# Patient Record
Sex: FEMALE | Race: BLACK OR AFRICAN AMERICAN | NOT HISPANIC OR LATINO | URBAN - METROPOLITAN AREA
[De-identification: names, ages, dates, MRNs, and addresses within clinical notes are randomized per-mention and may not be internally consistent; named-entity substitution may affect disease eponyms.]

---

## 2017-07-01 ENCOUNTER — OUTPATIENT (OUTPATIENT)
Dept: OUTPATIENT SERVICES | Facility: HOSPITAL | Age: 82
LOS: 1 days | End: 2017-07-01
Payer: MEDICAID

## 2017-07-06 DIAGNOSIS — R69 ILLNESS, UNSPECIFIED: ICD-10-CM

## 2018-01-01 PROCEDURE — G9001: CPT

## 2018-01-24 ENCOUNTER — INPATIENT (INPATIENT)
Facility: HOSPITAL | Age: 83
LOS: 1 days | Discharge: HOME HEALTH | End: 2018-01-26
Attending: INTERNAL MEDICINE | Admitting: INTERNAL MEDICINE
Payer: MEDICARE

## 2018-01-24 VITALS
TEMPERATURE: 98 F | SYSTOLIC BLOOD PRESSURE: 140 MMHG | OXYGEN SATURATION: 79 % | HEART RATE: 73 BPM | DIASTOLIC BLOOD PRESSURE: 64 MMHG | RESPIRATION RATE: 18 BRPM

## 2018-01-24 DIAGNOSIS — J10.1 INFLUENZA DUE TO OTHER IDENTIFIED INFLUENZA VIRUS WITH OTHER RESPIRATORY MANIFESTATIONS: ICD-10-CM

## 2018-01-24 DIAGNOSIS — J44.1 CHRONIC OBSTRUCTIVE PULMONARY DISEASE WITH (ACUTE) EXACERBATION: ICD-10-CM

## 2018-01-24 DIAGNOSIS — I10 ESSENTIAL (PRIMARY) HYPERTENSION: ICD-10-CM

## 2018-01-24 DIAGNOSIS — I25.10 ATHEROSCLEROTIC HEART DISEASE OF NATIVE CORONARY ARTERY WITHOUT ANGINA PECTORIS: ICD-10-CM

## 2018-01-24 LAB
ALBUMIN SERPL ELPH-MCNC: 3.8 G/DL — SIGNIFICANT CHANGE UP (ref 3.3–5)
ALP SERPL-CCNC: 75 U/L — SIGNIFICANT CHANGE UP (ref 40–120)
ALT FLD-CCNC: 14 U/L — SIGNIFICANT CHANGE UP (ref 4–33)
APTT BLD: 38.8 SEC — HIGH (ref 27.5–37.4)
AST SERPL-CCNC: 25 U/L — SIGNIFICANT CHANGE UP (ref 4–32)
B PERT DNA SPEC QL NAA+PROBE: SIGNIFICANT CHANGE UP
BASE EXCESS BLDV CALC-SCNC: 2.2 MMOL/L — SIGNIFICANT CHANGE UP
BASOPHILS # BLD AUTO: 0.02 K/UL — SIGNIFICANT CHANGE UP (ref 0–0.2)
BASOPHILS NFR BLD AUTO: 0.4 % — SIGNIFICANT CHANGE UP (ref 0–2)
BASOPHILS NFR SPEC: 0 % — SIGNIFICANT CHANGE UP (ref 0–2)
BILIRUB SERPL-MCNC: 0.3 MG/DL — SIGNIFICANT CHANGE UP (ref 0.2–1.2)
BLOOD GAS VENOUS - CREATININE: 0.85 MG/DL — SIGNIFICANT CHANGE UP (ref 0.5–1.3)
BUN SERPL-MCNC: 12 MG/DL — SIGNIFICANT CHANGE UP (ref 7–23)
C PNEUM DNA SPEC QL NAA+PROBE: NOT DETECTED — SIGNIFICANT CHANGE UP
CALCIUM SERPL-MCNC: 8.1 MG/DL — LOW (ref 8.4–10.5)
CHLORIDE BLDV-SCNC: 108 MMOL/L — SIGNIFICANT CHANGE UP (ref 96–108)
CHLORIDE SERPL-SCNC: 104 MMOL/L — SIGNIFICANT CHANGE UP (ref 98–107)
CK MB BLD-MCNC: 1.39 NG/ML — SIGNIFICANT CHANGE UP (ref 1–4.7)
CK SERPL-CCNC: 134 U/L — SIGNIFICANT CHANGE UP (ref 25–170)
CO2 SERPL-SCNC: 24 MMOL/L — SIGNIFICANT CHANGE UP (ref 22–31)
CREAT SERPL-MCNC: 1 MG/DL — SIGNIFICANT CHANGE UP (ref 0.5–1.3)
EOSINOPHIL # BLD AUTO: 0.02 K/UL — SIGNIFICANT CHANGE UP (ref 0–0.5)
EOSINOPHIL NFR BLD AUTO: 0.4 % — SIGNIFICANT CHANGE UP (ref 0–6)
EOSINOPHIL NFR FLD: 0 % — SIGNIFICANT CHANGE UP (ref 0–6)
FLUAV H1 2009 PAND RNA SPEC QL NAA+PROBE: NOT DETECTED — SIGNIFICANT CHANGE UP
FLUAV H1 RNA SPEC QL NAA+PROBE: NOT DETECTED — SIGNIFICANT CHANGE UP
FLUAV H3 RNA SPEC QL NAA+PROBE: NOT DETECTED — SIGNIFICANT CHANGE UP
FLUAV SUBTYP SPEC NAA+PROBE: SIGNIFICANT CHANGE UP
FLUBV RNA SPEC QL NAA+PROBE: POSITIVE — HIGH
GAS PNL BLDV: 140 MMOL/L — SIGNIFICANT CHANGE UP (ref 136–146)
GIANT PLATELETS BLD QL SMEAR: PRESENT — SIGNIFICANT CHANGE UP
GLUCOSE BLDV-MCNC: 128 — HIGH (ref 70–99)
GLUCOSE SERPL-MCNC: 127 MG/DL — HIGH (ref 70–99)
HADV DNA SPEC QL NAA+PROBE: NOT DETECTED — SIGNIFICANT CHANGE UP
HCO3 BLDV-SCNC: 26 MMOL/L — SIGNIFICANT CHANGE UP (ref 20–27)
HCOV 229E RNA SPEC QL NAA+PROBE: NOT DETECTED — SIGNIFICANT CHANGE UP
HCOV HKU1 RNA SPEC QL NAA+PROBE: NOT DETECTED — SIGNIFICANT CHANGE UP
HCOV NL63 RNA SPEC QL NAA+PROBE: NOT DETECTED — SIGNIFICANT CHANGE UP
HCOV OC43 RNA SPEC QL NAA+PROBE: NOT DETECTED — SIGNIFICANT CHANGE UP
HCT VFR BLD CALC: 38.1 % — SIGNIFICANT CHANGE UP (ref 34.5–45)
HCT VFR BLDV CALC: 37.9 % — SIGNIFICANT CHANGE UP (ref 34.5–45)
HGB BLD-MCNC: 12.6 G/DL — SIGNIFICANT CHANGE UP (ref 11.5–15.5)
HGB BLDV-MCNC: 12.3 G/DL — SIGNIFICANT CHANGE UP (ref 11.5–15.5)
HMPV RNA SPEC QL NAA+PROBE: NOT DETECTED — SIGNIFICANT CHANGE UP
HPIV1 RNA SPEC QL NAA+PROBE: NOT DETECTED — SIGNIFICANT CHANGE UP
HPIV2 RNA SPEC QL NAA+PROBE: NOT DETECTED — SIGNIFICANT CHANGE UP
HPIV3 RNA SPEC QL NAA+PROBE: NOT DETECTED — SIGNIFICANT CHANGE UP
HPIV4 RNA SPEC QL NAA+PROBE: NOT DETECTED — SIGNIFICANT CHANGE UP
IMM GRANULOCYTES # BLD AUTO: 0.02 # — SIGNIFICANT CHANGE UP
IMM GRANULOCYTES NFR BLD AUTO: 0.4 % — SIGNIFICANT CHANGE UP (ref 0–1.5)
INR BLD: 1.08 — SIGNIFICANT CHANGE UP (ref 0.88–1.17)
LACTATE BLDV-MCNC: 1.2 MMOL/L — SIGNIFICANT CHANGE UP (ref 0.5–2)
LYMPHOCYTES # BLD AUTO: 1.23 K/UL — SIGNIFICANT CHANGE UP (ref 1–3.3)
LYMPHOCYTES # BLD AUTO: 22.9 % — SIGNIFICANT CHANGE UP (ref 13–44)
LYMPHOCYTES NFR SPEC AUTO: 23.7 % — SIGNIFICANT CHANGE UP (ref 13–44)
M PNEUMO DNA SPEC QL NAA+PROBE: NOT DETECTED — SIGNIFICANT CHANGE UP
MCHC RBC-ENTMCNC: 27 PG — SIGNIFICANT CHANGE UP (ref 27–34)
MCHC RBC-ENTMCNC: 33.1 % — SIGNIFICANT CHANGE UP (ref 32–36)
MCV RBC AUTO: 81.8 FL — SIGNIFICANT CHANGE UP (ref 80–100)
MONOCYTES # BLD AUTO: 0.49 K/UL — SIGNIFICANT CHANGE UP (ref 0–0.9)
MONOCYTES NFR BLD AUTO: 9.1 % — SIGNIFICANT CHANGE UP (ref 2–14)
MONOCYTES NFR BLD: 3.5 % — SIGNIFICANT CHANGE UP (ref 2–9)
NEUTROPHIL AB SER-ACNC: 71.9 % — SIGNIFICANT CHANGE UP (ref 43–77)
NEUTROPHILS # BLD AUTO: 3.58 K/UL — SIGNIFICANT CHANGE UP (ref 1.8–7.4)
NEUTROPHILS NFR BLD AUTO: 66.8 % — SIGNIFICANT CHANGE UP (ref 43–77)
NRBC # FLD: 0 — SIGNIFICANT CHANGE UP
NT-PROBNP SERPL-SCNC: 598.1 PG/ML — SIGNIFICANT CHANGE UP
PCO2 BLDV: 45 MMHG — SIGNIFICANT CHANGE UP (ref 41–51)
PH BLDV: 7.39 PH — SIGNIFICANT CHANGE UP (ref 7.32–7.43)
PLATELET # BLD AUTO: 152 K/UL — SIGNIFICANT CHANGE UP (ref 150–400)
PLATELET COUNT - ESTIMATE: NORMAL — SIGNIFICANT CHANGE UP
PMV BLD: 11.4 FL — SIGNIFICANT CHANGE UP (ref 7–13)
PO2 BLDV: 58 MMHG — HIGH (ref 35–40)
POIKILOCYTOSIS BLD QL AUTO: SLIGHT — SIGNIFICANT CHANGE UP
POTASSIUM BLDV-SCNC: 5.2 MMOL/L — HIGH (ref 3.4–4.5)
POTASSIUM SERPL-MCNC: 4.3 MMOL/L — SIGNIFICANT CHANGE UP (ref 3.5–5.3)
POTASSIUM SERPL-SCNC: 4.3 MMOL/L — SIGNIFICANT CHANGE UP (ref 3.5–5.3)
PROT SERPL-MCNC: 7.2 G/DL — SIGNIFICANT CHANGE UP (ref 6–8.3)
PROTHROM AB SERPL-ACNC: 12.4 SEC — SIGNIFICANT CHANGE UP (ref 9.8–13.1)
RBC # BLD: 4.66 M/UL — SIGNIFICANT CHANGE UP (ref 3.8–5.2)
RBC # FLD: 15.9 % — HIGH (ref 10.3–14.5)
REVIEW TO FOLLOW: YES — SIGNIFICANT CHANGE UP
RSV RNA SPEC QL NAA+PROBE: NOT DETECTED — SIGNIFICANT CHANGE UP
RV+EV RNA SPEC QL NAA+PROBE: NOT DETECTED — SIGNIFICANT CHANGE UP
SAO2 % BLDV: 87.5 % — HIGH (ref 60–85)
SODIUM SERPL-SCNC: 143 MMOL/L — SIGNIFICANT CHANGE UP (ref 135–145)
TARGETS BLD QL SMEAR: SIGNIFICANT CHANGE UP
TROPONIN T SERPL-MCNC: < 0.06 NG/ML — SIGNIFICANT CHANGE UP (ref 0–0.06)
VARIANT LYMPHS # BLD: 0.9 % — SIGNIFICANT CHANGE UP
WBC # BLD: 5.36 K/UL — SIGNIFICANT CHANGE UP (ref 3.8–10.5)
WBC # FLD AUTO: 5.36 K/UL — SIGNIFICANT CHANGE UP (ref 3.8–10.5)

## 2018-01-24 PROCEDURE — 99223 1ST HOSP IP/OBS HIGH 75: CPT

## 2018-01-24 PROCEDURE — 71046 X-RAY EXAM CHEST 2 VIEWS: CPT | Mod: 26

## 2018-01-24 RX ORDER — AZITHROMYCIN 500 MG/1
500 TABLET, FILM COATED ORAL EVERY 24 HOURS
Qty: 0 | Refills: 0 | Status: DISCONTINUED | OUTPATIENT
Start: 2018-01-25 | End: 2018-01-26

## 2018-01-24 RX ORDER — IPRATROPIUM/ALBUTEROL SULFATE 18-103MCG
3 AEROSOL WITH ADAPTER (GRAM) INHALATION EVERY 6 HOURS
Qty: 0 | Refills: 0 | Status: DISCONTINUED | OUTPATIENT
Start: 2018-01-24 | End: 2018-01-26

## 2018-01-24 RX ORDER — ASPIRIN/CALCIUM CARB/MAGNESIUM 324 MG
81 TABLET ORAL DAILY
Qty: 0 | Refills: 0 | Status: DISCONTINUED | OUTPATIENT
Start: 2018-01-24 | End: 2018-01-26

## 2018-01-24 RX ORDER — IPRATROPIUM/ALBUTEROL SULFATE 18-103MCG
3 AEROSOL WITH ADAPTER (GRAM) INHALATION ONCE
Qty: 0 | Refills: 0 | Status: COMPLETED | OUTPATIENT
Start: 2018-01-24 | End: 2018-01-24

## 2018-01-24 RX ORDER — ACETAMINOPHEN 500 MG
650 TABLET ORAL ONCE
Qty: 0 | Refills: 0 | Status: COMPLETED | OUTPATIENT
Start: 2018-01-24 | End: 2018-01-24

## 2018-01-24 RX ORDER — DILTIAZEM HCL 120 MG
240 CAPSULE, EXT RELEASE 24 HR ORAL
Qty: 0 | Refills: 0 | Status: DISCONTINUED | OUTPATIENT
Start: 2018-01-24 | End: 2018-01-26

## 2018-01-24 RX ORDER — BUDESONIDE AND FORMOTEROL FUMARATE DIHYDRATE 160; 4.5 UG/1; UG/1
2 AEROSOL RESPIRATORY (INHALATION)
Qty: 0 | Refills: 0 | Status: DISCONTINUED | OUTPATIENT
Start: 2018-01-24 | End: 2018-01-26

## 2018-01-24 RX ORDER — ENOXAPARIN SODIUM 100 MG/ML
30 INJECTION SUBCUTANEOUS EVERY 24 HOURS
Qty: 0 | Refills: 0 | Status: DISCONTINUED | OUTPATIENT
Start: 2018-01-24 | End: 2018-01-26

## 2018-01-24 RX ORDER — SIMVASTATIN 20 MG/1
10 TABLET, FILM COATED ORAL AT BEDTIME
Qty: 0 | Refills: 0 | Status: DISCONTINUED | OUTPATIENT
Start: 2018-01-24 | End: 2018-01-26

## 2018-01-24 RX ORDER — GABAPENTIN 400 MG/1
300 CAPSULE ORAL THREE TIMES A DAY
Qty: 0 | Refills: 0 | Status: DISCONTINUED | OUTPATIENT
Start: 2018-01-24 | End: 2018-01-26

## 2018-01-24 RX ORDER — AZITHROMYCIN 500 MG/1
500 TABLET, FILM COATED ORAL ONCE
Qty: 0 | Refills: 0 | Status: COMPLETED | OUTPATIENT
Start: 2018-01-24 | End: 2018-01-24

## 2018-01-24 RX ORDER — FUROSEMIDE 40 MG
40 TABLET ORAL DAILY
Qty: 0 | Refills: 0 | Status: DISCONTINUED | OUTPATIENT
Start: 2018-01-24 | End: 2018-01-26

## 2018-01-24 RX ORDER — GABAPENTIN 400 MG/1
1 CAPSULE ORAL
Qty: 0 | Refills: 0 | COMMUNITY

## 2018-01-24 RX ORDER — LISINOPRIL 2.5 MG/1
40 TABLET ORAL DAILY
Qty: 0 | Refills: 0 | Status: DISCONTINUED | OUTPATIENT
Start: 2018-01-25 | End: 2018-01-26

## 2018-01-24 RX ORDER — OXYMETAZOLINE HYDROCHLORIDE 0.5 MG/ML
1 SPRAY NASAL
Qty: 0 | Refills: 0 | Status: DISCONTINUED | OUTPATIENT
Start: 2018-01-24 | End: 2018-01-26

## 2018-01-24 RX ADMIN — BUDESONIDE AND FORMOTEROL FUMARATE DIHYDRATE 2 PUFF(S): 160; 4.5 AEROSOL RESPIRATORY (INHALATION) at 23:44

## 2018-01-24 RX ADMIN — AZITHROMYCIN 250 MILLIGRAM(S): 500 TABLET, FILM COATED ORAL at 15:46

## 2018-01-24 RX ADMIN — Medication 650 MILLIGRAM(S): at 22:50

## 2018-01-24 RX ADMIN — SIMVASTATIN 10 MILLIGRAM(S): 20 TABLET, FILM COATED ORAL at 23:34

## 2018-01-24 RX ADMIN — GABAPENTIN 300 MILLIGRAM(S): 400 CAPSULE ORAL at 23:34

## 2018-01-24 RX ADMIN — Medication 125 MILLIGRAM(S): at 14:45

## 2018-01-24 RX ADMIN — Medication 3 MILLILITER(S): at 14:45

## 2018-01-24 RX ADMIN — Medication 3 MILLILITER(S): at 14:31

## 2018-01-24 RX ADMIN — Medication 3 MILLILITER(S): at 23:26

## 2018-01-24 RX ADMIN — Medication 3 MILLILITER(S): at 14:50

## 2018-01-24 RX ADMIN — AZITHROMYCIN 250 MILLIGRAM(S): 500 TABLET, FILM COATED ORAL at 23:35

## 2018-01-24 RX ADMIN — Medication 240 MILLIGRAM(S): at 23:33

## 2018-01-24 RX ADMIN — Medication 0.3 MILLIGRAM(S): at 23:48

## 2018-01-24 RX ADMIN — OXYMETAZOLINE HYDROCHLORIDE 1 SPRAY(S): 0.5 SPRAY NASAL at 23:36

## 2018-01-24 RX ADMIN — Medication 650 MILLIGRAM(S): at 22:06

## 2018-01-24 NOTE — H&P ADULT - HISTORY OF PRESENT ILLNESS
Patient is an 86 y/o F PMH COPD noncompliant with home O2, HTN, HLD, CAD p/w SOB and cough X1 week. Patient began having Began having nasal congestion and cough one week ago. At baseline is only able to walk 5-6 steps without stopping and now has decreased exercise tolerance. Patient hypoxic to 79% on RA in triage and improved to 93% with 3L NC. Patient is an 86 y/o F PMH COPD noncompliant with home O2, HTN, HLD, CAD p/w SOB and cough X1 week. Patient began having rhinorrea and cough about 1 week ago. Progressed to SOB, GARCIA, worsening cough, and body aches. Denies any headache, N/V, decreased PO intake. Patient lives at home alone, denies any sick contacts. Patient Began having nasal congestion and cough one week ago. At baseline is only able to walk 5-6 steps without stopping and now has decreased exercise tolerance. Patient hypoxic to 79% on RA in triage and improved to 93% with 3L NC. Patient is an 84 y/o F PMH COPD noncompliant with home O2, HTN, HLD, CAD p/w SOB and cough X1 week. Patient began having rhinorrea and cough about 1 week ago. Progressed to SOB, GARCIA, worsening cough, and body aches. Denies any headache, N/V, decreased PO intake. Patient lives at home alone, denies any sick contacts. Patient is supposed to be on Oxygen continuosly at home, however, she only uses it when she feels she needs it (adds up to about 1-2 hours per day). At baseline is only able to walk 5-6 steps w/ a walker and now has decreased exercise tolerance. Patient hypoxic to 79% on RA in triage and improved to 93% with 3L NC.

## 2018-01-24 NOTE — H&P ADULT - NSHPLABSRESULTS_GEN_ALL_CORE
01-24    143  |  104  |  12  ----------------------------<  127<H>  4.3   |  24  |  1.00    Ca    8.1<L>      24 Jan 2018 14:31    TPro  7.2  /  Alb  3.8  /  TBili  0.3  /  DBili  x   /  AST  25  /  ALT  14  /  AlkPhos  75  01-24                            12.6   5.36  )-----------( 152      ( 24 Jan 2018 14:31 )             38.1       CARDIAC MARKERS ( 24 Jan 2018 14:31 )  x     / < 0.06 ng/mL / 134 u/L / 1.39 ng/mL / x            LIVER FUNCTIONS - ( 24 Jan 2018 14:31 )  Alb: 3.8 g/dL / Pro: 7.2 g/dL / ALK PHOS: 75 u/L / ALT: 14 u/L / AST: 25 u/L / GGT: x             PT/INR - ( 24 Jan 2018 14:31 )   PT: 12.4 SEC;   INR: 1.08          PTT - ( 24 Jan 2018 14:31 )  PTT:38.8 SEC        14:31 - VBG - pH: 7.39  | pCO2: 45    | pO2: 58    | Lactate: 1.2    EKG personally reviewed, NSR, no acute findings

## 2018-01-24 NOTE — H&P ADULT - ASSESSMENT
Patient is an 84 y/o F PMH COPD noncompliant with home O2, HTN, HLD, CAD p/w COPD exacerbation, influenza positive

## 2018-01-24 NOTE — ED PROVIDER NOTE - MEDICAL DECISION MAKING DETAILS
86 y/o with h/o COPD, HTN presents with complaint of cough and L lower rib pain x one week, hypoxia improved with 2L NC and not at respiratory distress at rest. Does not appear to be fluid overloaded. COPD exacerbation vs PNA. CXR, nebs, steroids, Mode, EKG, admit

## 2018-01-24 NOTE — H&P ADULT - NSHPPHYSICALEXAM_GEN_ALL_CORE
T(C): 36.5 (01-24-18 @ 20:00), Max: 37.1 (01-24-18 @ 16:39)  HR: 76 (01-24-18 @ 20:00) (64 - 96)  BP: 135/71 (01-24-18 @ 20:00) (105/68 - 163/76)  RR: 18 (01-24-18 @ 20:00) (18 - 18)  SpO2: 97% (01-24-18 @ 20:00) (79% - 97%)    Constitutional: NAD, well-developed, well-nourished  Ears, Nose, Mouth, and Throat: normal external ears and nose, normal hearing, moist oral mucosa  Eyes: normal conjunctiva, EOMI, PERRLA  Neck: supple, no JVD  Respiratory: diffuse course breath sounds, wheezing, normal respiratory effort  Cardiovascular: RRR, no M/R/G, no edema, 2+ Peripheral Pulses  Gastrointestinal: soft, nontender, nondistended, +BS, no hernia  Skin: warm, dry, no rash  Neurologic: sensation grossly intact, CN grossly intact, non-focal exam  Musculoskeletal: no clubbing, no cyanosis, no joint swelling  Psychiatric: AOX3, normal mood, affect

## 2018-01-24 NOTE — ED PROVIDER NOTE - OBJECTIVE STATEMENT
84 y/o F with h/o COPD noncompliant with home O2, HTN, HLD, CAD presents with complaint of SOB and cough x one week. Began having nasal congestion and cough one week ago. Last few days having L flank/lower rib pain that wraps around to the back. At baseline is only able to walk 5-6 steps without stopping and now has decreased ET. Denies orthopnea or LE edema. Pain is intermittent and does not increase with exertion. Patient hypoxic to 79% on RA in triage and improved to 93% with 3L NC.

## 2018-01-24 NOTE — ED ADULT NURSE NOTE - OBJECTIVE STATEMENT
pt AO x3, ambulatory with walker or cane, PMH of COPD, c/o SOB with cough for a week. Pt using O2 at home as needed but recently SOB was getting worse. Denies any pain, fever, chill or n/v at this time. pt also reports she took "pneumonia shot" in her PMD office a week ago. Evaluated by provider. Blood obtained and sent to LAB. IV inserted. Right wrist 20G. Cardiac monitor in place. Will continue to monitor.

## 2018-01-24 NOTE — ED ADULT TRIAGE NOTE - CHIEF COMPLAINT QUOTE
Pt with PMH of COPD, c/o SOB with cough and chest pain, denies fever. Pt using O2 at home, noted to be 79% on room air, placed on O2 in triage.

## 2018-01-24 NOTE — H&P ADULT - NSHPREVIEWOFSYSTEMS_GEN_ALL_CORE
Constitutional Symptoms: +weakness, no fevers, chills, weight loss  Eyes: No visual changes, headache, eye pain, double vision  Ears, Nose, Mouth, Throat: +runny nose, no sinus pain, ear pain, tinnitus, sore throat, dysphagia, odynophagia  Cardiovascular: No chest pain, palpitations, edema  Respiratory: +cough, wheezing, shortness of breath  Gastrointestinal: No abdominal pain, dysphagia, anorexia, nausea/vomiting, diarrhea/constipation, hematemesis, BRBPR, melaena  Genitourinary: No dysuria, frequency, hematuria  Musculoskeletal: +B/L shoulder pain (chronic), no joint swelling  Skin: No pruritus, rashes, lesions, wounds  Neurologic:  No seizures, headache, paraesthesiae, numbness, limb weakness    Positives and pertinent negatives noted and all other systems negative.

## 2018-01-24 NOTE — ED PROVIDER NOTE - ATTENDING CONTRIBUTION TO CARE
DR. BOBO, ATTENDING MD-  I performed a face to face bedside interview with patient regarding history of present illness, review of symptoms and past medical history. I completed an independent physical exam.  I have discussed patient's plan of care with the resident.   Documentation as above in the note.    84 y/o female h/o copd, high chol, htn on 2-3L home o2 (non-comp) here with c/o inc to chronic cough, sob x1 wk.  Denies f/c, ha, neck stiffness, cp, abd pain, n/v/d, dysuria, rash.  Afebrile, hypoxic to 79% on ra, 99% on 2L NC, appears fatigued, diffuse end exp wheeze all lung fields, no acc muscle use, s1s2 rrr no m/r/g, abd soft non ttp no r/g, no cva tenderness b/l, no leg swelling b/l, no rash.  COPD exac likely secondary to uri vs pna.  Obtain cbc, bmp, rvp, cxr, give duonebs, steroid, azithro, will likely need admission for further care.

## 2018-01-25 PROCEDURE — 99223 1ST HOSP IP/OBS HIGH 75: CPT

## 2018-01-25 RX ORDER — CHLORHEXIDINE GLUCONATE 213 G/1000ML
1 SOLUTION TOPICAL DAILY
Qty: 0 | Refills: 0 | Status: DISCONTINUED | OUTPATIENT
Start: 2018-01-25 | End: 2018-01-26

## 2018-01-25 RX ADMIN — CHLORHEXIDINE GLUCONATE 1 APPLICATION(S): 213 SOLUTION TOPICAL at 12:05

## 2018-01-25 RX ADMIN — Medication 3 MILLILITER(S): at 11:14

## 2018-01-25 RX ADMIN — SIMVASTATIN 10 MILLIGRAM(S): 20 TABLET, FILM COATED ORAL at 21:13

## 2018-01-25 RX ADMIN — BUDESONIDE AND FORMOTEROL FUMARATE DIHYDRATE 2 PUFF(S): 160; 4.5 AEROSOL RESPIRATORY (INHALATION) at 11:18

## 2018-01-25 RX ADMIN — Medication 81 MILLIGRAM(S): at 12:05

## 2018-01-25 RX ADMIN — Medication 0.3 MILLIGRAM(S): at 13:35

## 2018-01-25 RX ADMIN — OXYMETAZOLINE HYDROCHLORIDE 1 SPRAY(S): 0.5 SPRAY NASAL at 17:59

## 2018-01-25 RX ADMIN — Medication 40 MILLIGRAM(S): at 05:17

## 2018-01-25 RX ADMIN — Medication 0.3 MILLIGRAM(S): at 05:18

## 2018-01-25 RX ADMIN — GABAPENTIN 300 MILLIGRAM(S): 400 CAPSULE ORAL at 13:39

## 2018-01-25 RX ADMIN — Medication 3 MILLILITER(S): at 21:41

## 2018-01-25 RX ADMIN — Medication 240 MILLIGRAM(S): at 08:46

## 2018-01-25 RX ADMIN — LISINOPRIL 40 MILLIGRAM(S): 2.5 TABLET ORAL at 05:17

## 2018-01-25 RX ADMIN — Medication 240 MILLIGRAM(S): at 21:13

## 2018-01-25 RX ADMIN — GABAPENTIN 300 MILLIGRAM(S): 400 CAPSULE ORAL at 21:13

## 2018-01-25 RX ADMIN — OXYMETAZOLINE HYDROCHLORIDE 1 SPRAY(S): 0.5 SPRAY NASAL at 05:17

## 2018-01-25 RX ADMIN — GABAPENTIN 300 MILLIGRAM(S): 400 CAPSULE ORAL at 05:17

## 2018-01-25 RX ADMIN — ENOXAPARIN SODIUM 30 MILLIGRAM(S): 100 INJECTION SUBCUTANEOUS at 05:17

## 2018-01-25 RX ADMIN — Medication 75 MILLIGRAM(S): at 18:04

## 2018-01-25 RX ADMIN — Medication 0.3 MILLIGRAM(S): at 21:13

## 2018-01-25 RX ADMIN — Medication 3 MILLILITER(S): at 16:17

## 2018-01-25 RX ADMIN — BUDESONIDE AND FORMOTEROL FUMARATE DIHYDRATE 2 PUFF(S): 160; 4.5 AEROSOL RESPIRATORY (INHALATION) at 21:41

## 2018-01-25 RX ADMIN — Medication 3 MILLILITER(S): at 03:40

## 2018-01-25 NOTE — PHYSICAL THERAPY INITIAL EVALUATION ADULT - CRITERIA FOR SKILLED THERAPEUTIC INTERVENTIONS
rehab potential/anticipated discharge recommendation/impairments found/anticipated D/C to home with home PT services to address current functional limitations to optimize safety within the home environment./functional limitations in following categories

## 2018-01-25 NOTE — PROGRESS NOTE ADULT - ATTENDING COMMENTS
85F PMH COPD (uncertain if pt compliant with home O2 @3LNC), HTN, HLD, CAD p/w SOB and cough X1 week associated with myalgias. Pt symptoms starter 1 week ago and has not significantly improved with productive cough now causing left lateral rib pain upon coughing. RVP with + influenza B. Admitted for influenza B with COPD exacerbation.    - no fevers, no chills, no leukocytosis  - hold on tamiflu as pt is already 1 week post symptoms  - will treat for COPD exacerbation in setting of flu B: prednisone, bronchodilators, and azithromycin  - cont with 3l NC (O2 sat mid 90s on oxygen supplementation)  - pt states she is feeling better   - if she remains stable, likely can be d/barbie in next 24 hours with outpatient pulmonary follow up with her pulmonologist  - spoke with daughter and plan of care discussed with daughter with possible discharge in next day or so (family agrees)

## 2018-01-25 NOTE — PHYSICAL THERAPY INITIAL EVALUATION ADULT - PATIENT PROFILE REVIEW, REHAB EVAL
yes/Pt. profile reviewed, consulted with RN Radha MARTINS prior to initial PT evaluation and tx, as per RN, Pt. is OK to participate in skilled therapy session, current activity orders; ambulate with walker

## 2018-01-25 NOTE — PROGRESS NOTE ADULT - SUBJECTIVE AND OBJECTIVE BOX
CHIEF COMPLAINT:    Interval Events:    REVIEW OF SYSTEMS:  Constitutional:   Eyes:  ENT:  CV:  Resp:  GI:  :  MSK:  Integumentary:  Neurological:  Psychiatric:  Endocrine:  Hematologic/Lymphatic:  Allergic/Immunologic:  [ ] All other systems negative  [ ] Unable to assess ROS because ________    OBJECTIVE:  ICU Vital Signs Last 24 Hrs  T(C): 36.2 (25 Jan 2018 05:15), Max: 37.1 (24 Jan 2018 16:39)  T(F): 97.1 (25 Jan 2018 05:15), Max: 98.8 (24 Jan 2018 16:39)  HR: 77 (25 Jan 2018 08:43) (64 - 96)  BP: 161/82 (25 Jan 2018 08:43) (105/68 - 163/76)  BP(mean): --  ABP: --  ABP(mean): --  RR: 18 (25 Jan 2018 05:15) (18 - 18)  SpO2: 95% (25 Jan 2018 05:15) (79% - 98%)        CAPILLARY BLOOD GLUCOSE          PHYSICAL EXAM:  General:   HEENT:   Lymph Nodes:  Neck:   Respiratory:   Cardiovascular:   Abdomen:   Extremities:   Skin:   Neurological:  Psychiatry:    HOSPITAL MEDICATIONS:  MEDICATIONS  (STANDING):  ALBUTerol/ipratropium for Nebulization 3 milliLiter(s) Nebulizer every 6 hours  aspirin enteric coated 81 milliGRAM(s) Oral daily  azithromycin  IVPB 500 milliGRAM(s) IV Intermittent every 24 hours  buDESOnide 160 MICROgram(s)/formoterol 4.5 MICROgram(s) Inhaler 2 Puff(s) Inhalation two times a day  chlorhexidine 4% Liquid 1 Application(s) Topical daily  cloNIDine 0.3 milliGRAM(s) Oral three times a day  diltiazem    milliGRAM(s) Oral <User Schedule>  enoxaparin Injectable 30 milliGRAM(s) SubCutaneous every 24 hours  furosemide    Tablet 40 milliGRAM(s) Oral daily  gabapentin 300 milliGRAM(s) Oral three times a day  lisinopril 40 milliGRAM(s) Oral daily  oxymetazoline 0.05% Nasal Spray 1 Spray(s) Both Nostrils two times a day  predniSONE   Tablet 40 milliGRAM(s) Oral daily  simvastatin 10 milliGRAM(s) Oral at bedtime    MEDICATIONS  (PRN):      LABS:                        12.6   5.36  )-----------( 152      ( 24 Jan 2018 14:31 )             38.1     01-24    143  |  104  |  12  ----------------------------<  127<H>  4.3   |  24  |  1.00    Ca    8.1<L>      24 Jan 2018 14:31    TPro  7.2  /  Alb  3.8  /  TBili  0.3  /  DBili  x   /  AST  25  /  ALT  14  /  AlkPhos  75  01-24    PT/INR - ( 24 Jan 2018 14:31 )   PT: 12.4 SEC;   INR: 1.08          PTT - ( 24 Jan 2018 14:31 )  PTT:38.8 SEC      Venous Blood Gas:  01-24 @ 14:31  7.39/45/58/26/87.5  VBG Lactate: 1.2      MICROBIOLOGY:     RADIOLOGY:  [ ] Reviewed and interpreted by me    PULMONARY FUNCTION TESTS:    EKG: CHIEF COMPLAINT: Patient is a 85y old  Female who presents with a chief complaint of SOB (24 Jan 2018 21:45)    Interval Events: Admitted to RCU overnight    REVIEW OF SYSTEMS:  Constitutional: Feeling better  Eyes: Denies c/o  ENT: No c/o  CV: Denies CP  Resp: c/o cough  GI: Denies c/o  [x] All other systems negative      OBJECTIVE:  ICU Vital Signs Last 24 Hrs  T(C): 36.2 (25 Jan 2018 05:15), Max: 37.1 (24 Jan 2018 16:39)  T(F): 97.1 (25 Jan 2018 05:15), Max: 98.8 (24 Jan 2018 16:39)  HR: 77 (25 Jan 2018 08:43) (64 - 96)  BP: 161/82 (25 Jan 2018 08:43) (105/68 - 163/76)  BP(mean): --  ABP: --  ABP(mean): --  RR: 18 (25 Jan 2018 05:15) (18 - 18)  SpO2: 95% (25 Jan 2018 05:15) (79% - 98%)        CAPILLARY BLOOD GLUCOSE      HOSPITAL MEDICATIONS:  MEDICATIONS  (STANDING):  ALBUTerol/ipratropium for Nebulization 3 milliLiter(s) Nebulizer every 6 hours  aspirin enteric coated 81 milliGRAM(s) Oral daily  azithromycin  IVPB 500 milliGRAM(s) IV Intermittent every 24 hours  buDESOnide 160 MICROgram(s)/formoterol 4.5 MICROgram(s) Inhaler 2 Puff(s) Inhalation two times a day  chlorhexidine 4% Liquid 1 Application(s) Topical daily  cloNIDine 0.3 milliGRAM(s) Oral three times a day  diltiazem    milliGRAM(s) Oral <User Schedule>  enoxaparin Injectable 30 milliGRAM(s) SubCutaneous every 24 hours  furosemide    Tablet 40 milliGRAM(s) Oral daily  gabapentin 300 milliGRAM(s) Oral three times a day  lisinopril 40 milliGRAM(s) Oral daily  oxymetazoline 0.05% Nasal Spray 1 Spray(s) Both Nostrils two times a day  predniSONE   Tablet 40 milliGRAM(s) Oral daily  simvastatin 10 milliGRAM(s) Oral at bedtime    MEDICATIONS  (PRN):      LABS:                        12.6   5.36  )-----------( 152      ( 24 Jan 2018 14:31 )             38.1     01-24    143  |  104  |  12  ----------------------------<  127<H>  4.3   |  24  |  1.00    Ca    8.1<L>      24 Jan 2018 14:31    TPro  7.2  /  Alb  3.8  /  TBili  0.3  /  DBili  x   /  AST  25  /  ALT  14  /  AlkPhos  75  01-24    PT/INR - ( 24 Jan 2018 14:31 )   PT: 12.4 SEC;   INR: 1.08          PTT - ( 24 Jan 2018 14:31 )  PTT:38.8 SEC      Venous Blood Gas:  01-24 @ 14:31  7.39/45/58/26/87.5  VBG Lactate: 1.2      MICROBIOLOGY:     RADIOLOGY:  [ ] Reviewed and interpreted by me    PULMONARY FUNCTION TESTS:    EKG:

## 2018-01-25 NOTE — PHYSICAL THERAPY INITIAL EVALUATION ADULT - ADDITIONAL COMMENTS
Pt. lives in a pvt apartment alone. Pt. has no steps at home. Pt. was previously ambulating household and community distances without the use of an rolling walker independently. Pt. was previously independent with ADLs. Pt. returned to chair at end of therapy session with all lines and tubes intact, call bell in reach and in NAD. YAMILETH MARTINS made aware.

## 2018-01-25 NOTE — PHYSICAL THERAPY INITIAL EVALUATION ADULT - PERTINENT HX OF CURRENT PROBLEM, REHAB EVAL
Pt. is a 85 year old female admitted to [facility] secondary to COPD with acute exacerbation. PMH: HTN, HLD, CAD.

## 2018-01-25 NOTE — PHYSICAL THERAPY INITIAL EVALUATION ADULT - PRECAUTIONS/LIMITATIONS, REHAB EVAL
isolation precautions/oxygen therapy device and L/min/(+) droplet (+) supplemental O2 via NC (+)telemetry

## 2018-01-25 NOTE — PHYSICAL THERAPY INITIAL EVALUATION ADULT - GAIT DISTANCE, PT EVAL
10 feet/further ambulation distance not assessed secondary to Pt. Sp02 decreased to 87%, all other vitals WNL. Pt. denies any symptoms. Pt. returned to seated position in which SpO2 returned to above 90% after 1-2 min rest break. Pt. left in NAD. YAMILETH MARTINS made aware

## 2018-01-26 ENCOUNTER — TRANSCRIPTION ENCOUNTER (OUTPATIENT)
Age: 83
End: 2018-01-26

## 2018-01-26 VITALS — HEART RATE: 65 BPM | DIASTOLIC BLOOD PRESSURE: 71 MMHG | SYSTOLIC BLOOD PRESSURE: 137 MMHG

## 2018-01-26 LAB — GLUCOSE BLDC GLUCOMTR-MCNC: 157 MG/DL — HIGH (ref 70–99)

## 2018-01-26 PROCEDURE — 99239 HOSP IP/OBS DSCHRG MGMT >30: CPT

## 2018-01-26 RX ORDER — IPRATROPIUM/ALBUTEROL SULFATE 18-103MCG
3 AEROSOL WITH ADAPTER (GRAM) INHALATION ONCE
Qty: 0 | Refills: 0 | Status: COMPLETED | OUTPATIENT
Start: 2018-01-26 | End: 2018-01-26

## 2018-01-26 RX ORDER — AZITHROMYCIN 500 MG/1
1 TABLET, FILM COATED ORAL
Qty: 3 | Refills: 0 | OUTPATIENT
Start: 2018-01-26 | End: 2018-01-28

## 2018-01-26 RX ORDER — AZITHROMYCIN 500 MG/1
TABLET, FILM COATED ORAL
Qty: 0 | Refills: 0 | Status: DISCONTINUED | OUTPATIENT
Start: 2018-01-26 | End: 2018-01-26

## 2018-01-26 RX ORDER — AZITHROMYCIN 500 MG/1
500 TABLET, FILM COATED ORAL ONCE
Qty: 0 | Refills: 0 | Status: COMPLETED | OUTPATIENT
Start: 2018-01-26 | End: 2018-01-26

## 2018-01-26 RX ORDER — AZITHROMYCIN 500 MG/1
500 TABLET, FILM COATED ORAL EVERY 24 HOURS
Qty: 0 | Refills: 0 | Status: DISCONTINUED | OUTPATIENT
Start: 2018-01-27 | End: 2018-01-26

## 2018-01-26 RX ORDER — AZITHROMYCIN 500 MG/1
500 TABLET, FILM COATED ORAL ONCE
Qty: 0 | Refills: 0 | Status: DISCONTINUED | OUTPATIENT
Start: 2018-01-26 | End: 2018-01-26

## 2018-01-26 RX ADMIN — GABAPENTIN 300 MILLIGRAM(S): 400 CAPSULE ORAL at 05:14

## 2018-01-26 RX ADMIN — CHLORHEXIDINE GLUCONATE 1 APPLICATION(S): 213 SOLUTION TOPICAL at 13:19

## 2018-01-26 RX ADMIN — Medication 40 MILLIGRAM(S): at 05:14

## 2018-01-26 RX ADMIN — Medication 0.3 MILLIGRAM(S): at 05:11

## 2018-01-26 RX ADMIN — Medication 75 MILLIGRAM(S): at 05:14

## 2018-01-26 RX ADMIN — Medication 3 MILLILITER(S): at 11:15

## 2018-01-26 RX ADMIN — ENOXAPARIN SODIUM 30 MILLIGRAM(S): 100 INJECTION SUBCUTANEOUS at 05:13

## 2018-01-26 RX ADMIN — BUDESONIDE AND FORMOTEROL FUMARATE DIHYDRATE 2 PUFF(S): 160; 4.5 AEROSOL RESPIRATORY (INHALATION) at 11:15

## 2018-01-26 RX ADMIN — AZITHROMYCIN 500 MILLIGRAM(S): 500 TABLET, FILM COATED ORAL at 00:32

## 2018-01-26 RX ADMIN — Medication 3 MILLILITER(S): at 03:27

## 2018-01-26 RX ADMIN — Medication 240 MILLIGRAM(S): at 08:57

## 2018-01-26 RX ADMIN — OXYMETAZOLINE HYDROCHLORIDE 1 SPRAY(S): 0.5 SPRAY NASAL at 05:13

## 2018-01-26 RX ADMIN — LISINOPRIL 40 MILLIGRAM(S): 2.5 TABLET ORAL at 05:14

## 2018-01-26 RX ADMIN — GABAPENTIN 300 MILLIGRAM(S): 400 CAPSULE ORAL at 13:23

## 2018-01-26 RX ADMIN — Medication 81 MILLIGRAM(S): at 12:35

## 2018-01-26 RX ADMIN — Medication 3 MILLILITER(S): at 01:08

## 2018-01-26 RX ADMIN — Medication 0.3 MILLIGRAM(S): at 13:24

## 2018-01-26 NOTE — PROGRESS NOTE ADULT - ASSESSMENT
Patient is an 84 y/o F PMH COPD noncompliant with home O2, HTN, HLD, CAD p/w COPD exacerbation, influenza positive
Patient is an 86 y/o F PMH COPD noncompliant with home O2, HTN, HLD, CAD p/w COPD exacerbation, influenza positive

## 2018-01-26 NOTE — DISCHARGE NOTE ADULT - CARE PLAN
Principal Discharge DX:	COPD exacerbation  Goal:	relieve symptoms  Assessment and plan of treatment:	Continue Levaquin as prescribed. Follow up with pulmonology outpatient.  Please use home O2.  Secondary Diagnosis:	Essential hypertension  Assessment and plan of treatment:	Continue home medication. Follow up with PCP within one week of discharge.  Secondary Diagnosis:	Influenza B  Assessment and plan of treatment:	Continue tamiflu as prescribed  Secondary Diagnosis:	Coronary artery disease involving native coronary artery of native heart without angina pectoris  Assessment and plan of treatment:	Continue with ASA, statin, ACE Principal Discharge DX:	COPD exacerbation  Goal:	relieve symptoms  Assessment and plan of treatment:	Continue Levaquin as prescribed. Please follow up with pulmonology outpatient.  Please use home O2.  Secondary Diagnosis:	Essential hypertension  Assessment and plan of treatment:	Continue home medication. Follow up with PCP within one week of discharge.  Secondary Diagnosis:	Influenza B  Assessment and plan of treatment:	Continue tamiflu as prescribed.  Secondary Diagnosis:	Coronary artery disease involving native coronary artery of native heart without angina pectoris  Assessment and plan of treatment:	Continue with ASA, statin, ACE Principal Discharge DX:	COPD exacerbation  Goal:	relieve symptoms  Assessment and plan of treatment:	Continue azithromycin as prescribed. Please follow up with pulmonology outpatient.  Please use home O2.  Secondary Diagnosis:	Essential hypertension  Assessment and plan of treatment:	Continue home medication. Follow up with PCP within one week of discharge.  Secondary Diagnosis:	Influenza B  Assessment and plan of treatment:	Continue tamiflu as prescribed.  Secondary Diagnosis:	Coronary artery disease involving native coronary artery of native heart without angina pectoris  Assessment and plan of treatment:	Continue with ASA, statin, ACE

## 2018-01-26 NOTE — PROGRESS NOTE ADULT - SUBJECTIVE AND OBJECTIVE BOX
CHIEF COMPLAINT: Patient is a 85y old  Female who presents with a chief complaint of SOB (24 Jan 2018 21:45)    Interval Events: Admitted to RCU overnight    REVIEW OF SYSTEMS:  Constitutional: Feeling better  Eyes: Denies c/o  ENT: No c/o  CV: Denies CP  Resp: c/o cough  GI: Denies c/o  [x] All other systems negative      OBJECTIVE:  ICU Vital Signs Last 24 Hrs  T(C): 36.2 (25 Jan 2018 05:15), Max: 37.1 (24 Jan 2018 16:39)  T(F): 97.1 (25 Jan 2018 05:15), Max: 98.8 (24 Jan 2018 16:39)  HR: 77 (25 Jan 2018 08:43) (64 - 96)  BP: 161/82 (25 Jan 2018 08:43) (105/68 - 163/76)  BP(mean): --  ABP: --  ABP(mean): --  RR: 18 (25 Jan 2018 05:15) (18 - 18)  SpO2: 95% (25 Jan 2018 05:15) (79% - 98%)        CAPILLARY BLOOD GLUCOSE      HOSPITAL MEDICATIONS:  MEDICATIONS  (STANDING):  ALBUTerol/ipratropium for Nebulization 3 milliLiter(s) Nebulizer every 6 hours  aspirin enteric coated 81 milliGRAM(s) Oral daily  azithromycin  IVPB 500 milliGRAM(s) IV Intermittent every 24 hours  buDESOnide 160 MICROgram(s)/formoterol 4.5 MICROgram(s) Inhaler 2 Puff(s) Inhalation two times a day  chlorhexidine 4% Liquid 1 Application(s) Topical daily  cloNIDine 0.3 milliGRAM(s) Oral three times a day  diltiazem    milliGRAM(s) Oral <User Schedule>  enoxaparin Injectable 30 milliGRAM(s) SubCutaneous every 24 hours  furosemide    Tablet 40 milliGRAM(s) Oral daily  gabapentin 300 milliGRAM(s) Oral three times a day  lisinopril 40 milliGRAM(s) Oral daily  oxymetazoline 0.05% Nasal Spray 1 Spray(s) Both Nostrils two times a day  predniSONE   Tablet 40 milliGRAM(s) Oral daily  simvastatin 10 milliGRAM(s) Oral at bedtime    MEDICATIONS  (PRN):      LABS:                        12.6   5.36  )-----------( 152      ( 24 Jan 2018 14:31 )             38.1     01-24    143  |  104  |  12  ----------------------------<  127<H>  4.3   |  24  |  1.00    Ca    8.1<L>      24 Jan 2018 14:31    TPro  7.2  /  Alb  3.8  /  TBili  0.3  /  DBili  x   /  AST  25  /  ALT  14  /  AlkPhos  75  01-24    PT/INR - ( 24 Jan 2018 14:31 )   PT: 12.4 SEC;   INR: 1.08          PTT - ( 24 Jan 2018 14:31 )  PTT:38.8 SEC      Venous Blood Gas:  01-24 @ 14:31  7.39/45/58/26/87.5  VBG Lactate: 1.2      MICROBIOLOGY:     RADIOLOGY:  [ ] Reviewed and interpreted by me    PULMONARY FUNCTION TESTS:    EKG: CHIEF COMPLAINT: Patient is a 85y old  Female who presents with a chief complaint of SOB (24 Jan 2018 21:45)    Interval Events: No acute events overnight    REVIEW OF SYSTEMS:  Constitutional: Feeling better  Eyes: Denies c/o  ENT: No c/o  CV: Denies CP  Resp: c/o cough  GI: Denies c/o  [x] All other systems negative      OBJECTIVE:  ICU Vital Signs Last 24 Hrs  T(C): 36.2 (25 Jan 2018 05:15), Max: 37.1 (24 Jan 2018 16:39)  T(F): 97.1 (25 Jan 2018 05:15), Max: 98.8 (24 Jan 2018 16:39)  HR: 77 (25 Jan 2018 08:43) (64 - 96)  BP: 161/82 (25 Jan 2018 08:43) (105/68 - 163/76)  BP(mean): --  ABP: --  ABP(mean): --  RR: 18 (25 Jan 2018 05:15) (18 - 18)  SpO2: 95% (25 Jan 2018 05:15) (79% - 98%)        CAPILLARY BLOOD GLUCOSE      HOSPITAL MEDICATIONS:  MEDICATIONS  (STANDING):  ALBUTerol/ipratropium for Nebulization 3 milliLiter(s) Nebulizer every 6 hours  aspirin enteric coated 81 milliGRAM(s) Oral daily  azithromycin  IVPB 500 milliGRAM(s) IV Intermittent every 24 hours  buDESOnide 160 MICROgram(s)/formoterol 4.5 MICROgram(s) Inhaler 2 Puff(s) Inhalation two times a day  chlorhexidine 4% Liquid 1 Application(s) Topical daily  cloNIDine 0.3 milliGRAM(s) Oral three times a day  diltiazem    milliGRAM(s) Oral <User Schedule>  enoxaparin Injectable 30 milliGRAM(s) SubCutaneous every 24 hours  furosemide    Tablet 40 milliGRAM(s) Oral daily  gabapentin 300 milliGRAM(s) Oral three times a day  lisinopril 40 milliGRAM(s) Oral daily  oxymetazoline 0.05% Nasal Spray 1 Spray(s) Both Nostrils two times a day  predniSONE   Tablet 40 milliGRAM(s) Oral daily  simvastatin 10 milliGRAM(s) Oral at bedtime    MEDICATIONS  (PRN):      LABS:                        12.6   5.36  )-----------( 152      ( 24 Jan 2018 14:31 )             38.1     01-24    143  |  104  |  12  ----------------------------<  127<H>  4.3   |  24  |  1.00    Ca    8.1<L>      24 Jan 2018 14:31    TPro  7.2  /  Alb  3.8  /  TBili  0.3  /  DBili  x   /  AST  25  /  ALT  14  /  AlkPhos  75  01-24    PT/INR - ( 24 Jan 2018 14:31 )   PT: 12.4 SEC;   INR: 1.08          PTT - ( 24 Jan 2018 14:31 )  PTT:38.8 SEC      Venous Blood Gas:  01-24 @ 14:31  7.39/45/58/26/87.5  VBG Lactate: 1.2      MICROBIOLOGY:     RADIOLOGY:  [ ] Reviewed and interpreted by me    PULMONARY FUNCTION TESTS:    EKG: CHIEF COMPLAINT: Patient is a 85y old  Female who presents with a chief complaint of SOB (24 Jan 2018 21:45)    Interval Events: No acute events overnight    REVIEW OF SYSTEMS:  Constitutional: Feeling better wishing to go home  Eyes: Denies c/o  ENT: No c/o  CV: Denies CP  Resp: c/o cough, but improved  GI: Denies c/o  [x] All other systems negative      OBJECTIVE:  ICU Vital Signs Last 24 Hrs  T(C): 36.2 (25 Jan 2018 05:15), Max: 37.1 (24 Jan 2018 16:39)  T(F): 97.1 (25 Jan 2018 05:15), Max: 98.8 (24 Jan 2018 16:39)  HR: 77 (25 Jan 2018 08:43) (64 - 96)  BP: 161/82 (25 Jan 2018 08:43) (105/68 - 163/76)  BP(mean): --  ABP: --  ABP(mean): --  RR: 18 (25 Jan 2018 05:15) (18 - 18)  SpO2: 95% (25 Jan 2018 05:15) (79% - 98%)        CAPILLARY BLOOD GLUCOSE      HOSPITAL MEDICATIONS:  MEDICATIONS  (STANDING):  ALBUTerol/ipratropium for Nebulization 3 milliLiter(s) Nebulizer every 6 hours  aspirin enteric coated 81 milliGRAM(s) Oral daily  azithromycin  IVPB 500 milliGRAM(s) IV Intermittent every 24 hours  buDESOnide 160 MICROgram(s)/formoterol 4.5 MICROgram(s) Inhaler 2 Puff(s) Inhalation two times a day  chlorhexidine 4% Liquid 1 Application(s) Topical daily  cloNIDine 0.3 milliGRAM(s) Oral three times a day  diltiazem    milliGRAM(s) Oral <User Schedule>  enoxaparin Injectable 30 milliGRAM(s) SubCutaneous every 24 hours  furosemide    Tablet 40 milliGRAM(s) Oral daily  gabapentin 300 milliGRAM(s) Oral three times a day  lisinopril 40 milliGRAM(s) Oral daily  oxymetazoline 0.05% Nasal Spray 1 Spray(s) Both Nostrils two times a day  predniSONE   Tablet 40 milliGRAM(s) Oral daily  simvastatin 10 milliGRAM(s) Oral at bedtime    MEDICATIONS  (PRN):      LABS:                        12.6   5.36  )-----------( 152      ( 24 Jan 2018 14:31 )             38.1     01-24    143  |  104  |  12  ----------------------------<  127<H>  4.3   |  24  |  1.00    Ca    8.1<L>      24 Jan 2018 14:31    TPro  7.2  /  Alb  3.8  /  TBili  0.3  /  DBili  x   /  AST  25  /  ALT  14  /  AlkPhos  75  01-24    PT/INR - ( 24 Jan 2018 14:31 )   PT: 12.4 SEC;   INR: 1.08          PTT - ( 24 Jan 2018 14:31 )  PTT:38.8 SEC      Venous Blood Gas:  01-24 @ 14:31  7.39/45/58/26/87.5  VBG Lactate: 1.2      MICROBIOLOGY:     RADIOLOGY:  [ ] Reviewed and interpreted by me    PULMONARY FUNCTION TESTS:    EKG:

## 2018-01-26 NOTE — DISCHARGE NOTE ADULT - HOME CARE AGENCY
Maria D/[facility] [phone] for Visiting Nurse Services & Home PT Evaluation. The 1st RN Visit will be within 24-48Hours of your Discharge Home.

## 2018-01-26 NOTE — CHART NOTE - NSCHARTNOTEFT_GEN_A_CORE
ADS    Called to evaluate this patient in need of an IV secondary to infiltration of old one.  Multiple attempts have been made.  Pt refusing another attempt by me to insert IV.  Changed IV abx to the equivalent po dose.  Pt complaining of wheezing.  Respiratory treatment given.     PE  neuro - alert and oriented  resp - slight exp wheezing noted posteriorly  cv - normal heart sounds, no clubbing  gi- + bowel sounds      Vital Signs Last 24 Hrs  T(C): 36.2 (25 Jan 2018 21:11), Max: 36.4 (25 Jan 2018 13:36)  T(F): 97.2 (25 Jan 2018 21:11), Max: 97.5 (25 Jan 2018 13:36)  HR: 73 (25 Jan 2018 21:44) (70 - 82)  BP: 137/66 (25 Jan 2018 21:11) (137/66 - 161/82)  BP(mean): --  RR: 18 (25 Jan 2018 21:11) (18 - 19)  SpO2: 98% (25 Jan 2018 21:44) (95% - 98%)        - abx changed to po  -stat albuterol tx given for complaints of wheezing.     Lea SIMMS ACNP

## 2018-01-26 NOTE — DISCHARGE NOTE ADULT - SECONDARY DIAGNOSIS.
Essential hypertension Influenza B Coronary artery disease involving native coronary artery of native heart without angina pectoris

## 2018-01-26 NOTE — PROGRESS NOTE ADULT - PROBLEM SELECTOR PLAN 2
Patient is about 1 week out sympton onset, supportive care as above.
Patient is about 1 week out sympton onset, supportive care as above.

## 2018-01-26 NOTE — PROGRESS NOTE ADULT - ATTENDING COMMENTS
85F PMH COPD (noncompliant with home O2 @3LNC per daughters), HTN, HLD, CAD p/w SOB and cough X1 week associated with myalgias.  RVP with + influenza B. Admitted for influenza B with COPD exacerbation.    - no fevers, no chills, no leukocytosis  - pt is already 1 week post symptoms and reports that symptoms have improved, no further pain on   - will treat for COPD exacerbation in setting of flu B: prednisone, bronchodilators, and azithromycin  - cont with 3l NC (O2 sat mid 90s on oxygen supplementation)  - pt states she is feeling better   - if she remains stable, likely can be d/barbie in next 24 hours with outpatient pulmonary follow up with her pulmonologist  - spoke with daughter and plan of care discussed with daughter with possible discharge in next day or so (family agrees) 85F PMH COPD (noncompliant with home O2 @3LNC per daughters), HTN, HLD, CAD p/w SOB and cough X1 week associated with myalgias.  RVP with + influenza B. Admitted for influenza B with COPD exacerbation.    - no fevers, no chills, no leukocytosis  - pt is already 1 week post symptoms and reports that symptoms have improved, no further pain on   - will treat for COPD exacerbation in setting of flu B: prednisone, bronchodilators, and azithromycin  - cont with 3l NC (O2 sat mid 90s on oxygen supplementation)  - pt states she is feeling better   - d/c with outpatient pulmonary follow up with her pulmonologist 85F PMH COPD (noncompliant with home O2 @3LNC per daughters), HTN, HLD, CAD p/w SOB and cough X1 week associated with myalgias.  RVP with + influenza B. Admitted for influenza B with COPD exacerbation.    - no fevers, no chills, no leukocytosis  - pt is already 1 week post symptoms and reports that symptoms have improved, no further pain with cough  - tamiflu for flu B for 5 day course  - will treat for COPD exacerbation in setting of flu B: prednisone (5 day course), bronchodilators, and azithromycin x5days  - cont with 3l NC (O2 sat mid 90s on oxygen supplementation)  - pt states she is feeling better and feels she is back at her baseline  - stable for discharge with outpatient pulmonary follow up with her pulmonologist  - spoke with daughter regarding pt's hosp course and discharge plan, family agrees to discharge  - had an in depth discussion with daughter and patient regarding compliance with oxygen supplementation

## 2018-01-26 NOTE — DISCHARGE NOTE ADULT - CARE PROVIDER_API CALL
Lisker, Gita N (MD), Medicine  Pulmonary Medicine  [address] [place] [address]  Phone: [phone]  Fax: [phone]

## 2018-01-26 NOTE — PROGRESS NOTE ADULT - PROBLEM SELECTOR PLAN 1
-Continue duonebs Q6H   -prednisone 40 mg x 5days  -azithromycin x 5 days  -Nasal cannula  -Incentive spirometer
-Continue duonebs Q6H   -prednisone 40 mg x 5days  -azithromycin x 5 days  -Nasal cannula  -Incentive spirometer

## 2018-01-26 NOTE — DISCHARGE NOTE ADULT - HOSPITAL COURSE
Patient is an 84 y/o F PMH COPD noncompliant with home O2, HTN, HLD, CAD p/w COPD exacerbation, influenza positive    COPD exacerbation.    S/p duonebs and solumedrol in ER, c/w duonebs Q6H w/ O2 NC, prednisone 40 mg, azithromycin.     Influenza B.    Patient is about 1 week out sympton onset, supportive care as above.   received Tamiflu as prescribed     Essential hypertension.     Continue with home meds.     Coronary artery disease involving native coronary artery of native heart without angina pectoris.    Continue with ASA, statin, ACE.     Patient is medically cleared for discharge home. Patient is an 86 y/o F PMH COPD noncompliant with home O2, HTN, HLD, CAD p/w COPD exacerbation, influenza positive    COPD exacerbation.    Chest xay  with Left lower lobe pneumonia (moderate index of suspicion. She is s/p duonebs and solumedrol in ER. Placed on azithromycin.     Influenza B.    Patient is about 1 week out sympton onset, supportive care as above.   received Tamiflu   rx to continue tamiflu for total of 10 doses     Essential hypertension.     Continue with home meds.     Coronary artery disease involving native coronary artery of native heart without angina pectoris.    Continue with ASA, statin, ACE.     Patient is medically cleared for discharge home. Patient is an 84 y/o F PMH COPD noncompliant with home O2, HTN, HLD, CAD p/w COPD exacerbation in setting of influenza B PNA    COPD exacerbation.    prednisone x5 days  duonebs  azithromycin x5 days  oxygen supplementation    Influenza B.    Tamiflu x5 days  supportive care       Essential hypertension.     Continue with home meds.     Coronary artery disease involving native coronary artery of native heart without angina pectoris.    Continue with ASA, statin, ACE.     Patient is medically cleared for discharge home. D/w patient and daughter (both in agreement for discharge)

## 2018-01-26 NOTE — DISCHARGE NOTE ADULT - MEDICATION SUMMARY - MEDICATIONS TO TAKE
I will START or STAY ON the medications listed below when I get home from the hospital:    predniSONE 20 mg oral tablet  -- 2 tab(s) by mouth once a day  -- Indication: For COPD exacerbation    Aspir 81 81 mg oral tablet  -- 1 tab(s) by mouth once a day  -- Indication: For prophylaxis    fosinopril 40 mg oral tablet  -- 1 tab(s) by mouth once a day  -- Indication: For Essential hypertension    cloNIDine 0.3 mg oral tablet  -- 1 tab(s) by mouth 3 times a day  -- Indication: For Essential hypertension    DilTIAZem Hydrochloride  mg/24 hours oral capsule, extended release  -- 1 cap(s) by mouth 2 times a day  -- Indication: For Coronary artery disease involving native coronary artery of native heart without angina pectoris    gabapentin 300 mg oral tablet  -- 1 tab(s) by mouth 3 times a day  -- Indication: For neuropathy    Zocor 10 mg oral tablet  -- 1 tab(s) by mouth once a day (at bedtime)  -- Indication: For HLD    oseltamivir 75 mg oral capsule  -- 1 cap(s) by mouth 2 times a day  Next dose due at 6pm  -- Indication: For Influenza B    Advair Diskus 500 mcg-50 mcg inhalation powder  -- 1 puff(s) inhaled 2 times a day  -- Indication: For Chronic obstructive pulmonary disease with acute exacerbation    Ventolin HFA CFC free 90 mcg/inh inhalation aerosol  -- 2 puff(s) inhaled 4 times a day  -- Indication: For Chronic obstructive pulmonary disease with acute exacerbation    Lasix 40 mg oral tablet  -- 1 tab(s) by mouth once a day  -- Indication: For Essential hypertension    azithromycin 500 mg oral tablet  -- 1 tab(s) by mouth once a day   -- Do not take dairy products, antacids, or iron preparations within one hour of this medication.  Finish all this medication unless otherwise directed by prescriber.    -- Indication: For COPD exacerbation

## 2018-01-26 NOTE — DISCHARGE NOTE ADULT - PLAN OF CARE
relieve symptoms Continue Levaquin as prescribed. Follow up with pulmonology outpatient.  Please use home O2. Continue home medication. Follow up with PCP within one week of discharge. Continue tamiflu as prescribed Continue with ASA, statin, ACE Continue Levaquin as prescribed. Please follow up with pulmonology outpatient.  Please use home O2. Continue tamiflu as prescribed. Continue azithromycin as prescribed. Please follow up with pulmonology outpatient.  Please use home O2.

## 2018-05-01 ENCOUNTER — OUTPATIENT (OUTPATIENT)
Dept: OUTPATIENT SERVICES | Facility: HOSPITAL | Age: 83
LOS: 1 days | End: 2018-05-01
Payer: MEDICAID

## 2018-05-01 PROCEDURE — G9001: CPT

## 2018-05-02 DIAGNOSIS — R69 ILLNESS, UNSPECIFIED: ICD-10-CM

## 2018-05-08 NOTE — PROGRESS NOTE ADULT - I WAS PHYSICALLY PRESENT FOR THE KEY PORTIONS OF THE EVALUATION AND MANAGEMENT (E/M) SERVICE PROVIDED.  I AGREE WITH THE ABOVE HISTORY, PHYSICAL, AND PLAN WHICH I HAVE REVIEWED AND EDITED WHERE APPROPRIATE
· s/p resection and recurrence treated with SIR spheres embolization in 2017, now with possible recurrence   · Heme/Oncology and surg/onc following
Statement Selected
Statement Selected

## 2018-06-04 PROBLEM — Z00.00 ENCOUNTER FOR PREVENTIVE HEALTH EXAMINATION: Status: ACTIVE | Noted: 2018-06-04

## 2018-06-22 ENCOUNTER — APPOINTMENT (OUTPATIENT)
Dept: PULMONOLOGY | Facility: CLINIC | Age: 83
End: 2018-06-22
Payer: MEDICARE

## 2018-06-22 VITALS
DIASTOLIC BLOOD PRESSURE: 90 MMHG | HEART RATE: 80 BPM | TEMPERATURE: 98.1 F | HEIGHT: 64 IN | WEIGHT: 198 LBS | BODY MASS INDEX: 33.8 KG/M2 | SYSTOLIC BLOOD PRESSURE: 180 MMHG

## 2018-06-22 DIAGNOSIS — I25.10 ATHEROSCLEROTIC HEART DISEASE OF NATIVE CORONARY ARTERY W/OUT ANGINA PECTORIS: ICD-10-CM

## 2018-06-22 DIAGNOSIS — Z87.81 PERSONAL HISTORY OF (HEALED) TRAUMATIC FRACTURE: ICD-10-CM

## 2018-06-22 DIAGNOSIS — E78.5 HYPERLIPIDEMIA, UNSPECIFIED: ICD-10-CM

## 2018-06-22 DIAGNOSIS — H91.90 UNSPECIFIED HEARING LOSS, UNSPECIFIED EAR: ICD-10-CM

## 2018-06-22 DIAGNOSIS — I10 ESSENTIAL (PRIMARY) HYPERTENSION: ICD-10-CM

## 2018-06-22 DIAGNOSIS — Z87.891 PERSONAL HISTORY OF NICOTINE DEPENDENCE: ICD-10-CM

## 2018-06-22 DIAGNOSIS — E11.9 TYPE 2 DIABETES MELLITUS W/OUT COMPLICATIONS: ICD-10-CM

## 2018-06-22 DIAGNOSIS — M19.90 UNSPECIFIED OSTEOARTHRITIS, UNSPECIFIED SITE: ICD-10-CM

## 2018-06-22 DIAGNOSIS — G25.0 ESSENTIAL TREMOR: ICD-10-CM

## 2018-06-22 PROCEDURE — 94729 DIFFUSING CAPACITY: CPT

## 2018-06-22 PROCEDURE — 94060 EVALUATION OF WHEEZING: CPT

## 2018-06-22 PROCEDURE — 94726 PLETHYSMOGRAPHY LUNG VOLUMES: CPT

## 2018-06-22 PROCEDURE — 99205 OFFICE O/P NEW HI 60 MIN: CPT | Mod: 25

## 2018-06-22 PROCEDURE — ZZZZZ: CPT

## 2018-06-22 RX ORDER — FUROSEMIDE 40 MG/1
40 TABLET ORAL
Qty: 90 | Refills: 0 | Status: ACTIVE | COMMUNITY
Start: 2018-06-19

## 2018-06-22 RX ORDER — GABAPENTIN 300 MG/1
300 CAPSULE ORAL
Qty: 270 | Refills: 0 | Status: ACTIVE | COMMUNITY
Start: 2018-05-15

## 2018-06-22 RX ORDER — CLONIDINE HYDROCHLORIDE 0.3 MG/1
0.3 TABLET ORAL
Qty: 270 | Refills: 0 | Status: ACTIVE | COMMUNITY
Start: 2018-01-16

## 2018-06-22 RX ORDER — SIMVASTATIN 10 MG/1
10 TABLET, FILM COATED ORAL
Qty: 90 | Refills: 0 | Status: ACTIVE | COMMUNITY
Start: 2018-04-17

## 2018-06-22 RX ORDER — FOSINOPRIL SODIUM 40 MG/1
40 TABLET ORAL
Qty: 90 | Refills: 0 | Status: ACTIVE | COMMUNITY
Start: 2018-04-11

## 2018-06-22 RX ORDER — CLONIDINE HYDROCHLORIDE 0.1 MG/1
0.1 TABLET ORAL
Qty: 270 | Refills: 0 | Status: ACTIVE | COMMUNITY
Start: 2018-04-21

## 2018-06-22 RX ORDER — DILTIAZEM HYDROCHLORIDE 240 MG/1
240 CAPSULE, EXTENDED RELEASE ORAL
Qty: 180 | Refills: 0 | Status: ACTIVE | COMMUNITY
Start: 2018-04-11

## 2018-07-19 ENCOUNTER — FORM ENCOUNTER (OUTPATIENT)
Age: 83
End: 2018-07-19

## 2018-07-19 ENCOUNTER — APPOINTMENT (OUTPATIENT)
Dept: PULMONOLOGY | Facility: CLINIC | Age: 83
End: 2018-07-19
Payer: MEDICARE

## 2018-07-19 VITALS
SYSTOLIC BLOOD PRESSURE: 140 MMHG | RESPIRATION RATE: 15 BRPM | HEIGHT: 64 IN | TEMPERATURE: 98.3 F | HEART RATE: 68 BPM | WEIGHT: 198 LBS | BODY MASS INDEX: 33.8 KG/M2 | DIASTOLIC BLOOD PRESSURE: 90 MMHG

## 2018-07-19 PROCEDURE — 99215 OFFICE O/P EST HI 40 MIN: CPT

## 2018-07-20 ENCOUNTER — APPOINTMENT (OUTPATIENT)
Dept: NUCLEAR MEDICINE | Facility: IMAGING CENTER | Age: 83
End: 2018-07-20
Payer: MEDICARE

## 2018-07-20 ENCOUNTER — OUTPATIENT (OUTPATIENT)
Dept: OUTPATIENT SERVICES | Facility: HOSPITAL | Age: 83
LOS: 1 days | End: 2018-07-20
Payer: MEDICARE

## 2018-07-20 DIAGNOSIS — D49.1 NEOPLASM OF UNSPECIFIED BEHAVIOR OF RESPIRATORY SYSTEM: ICD-10-CM

## 2018-07-20 PROCEDURE — 78815 PET IMAGE W/CT SKULL-THIGH: CPT | Mod: 26,PI

## 2018-07-20 PROCEDURE — A9552: CPT

## 2018-07-20 PROCEDURE — 78815 PET IMAGE W/CT SKULL-THIGH: CPT

## 2018-08-02 ENCOUNTER — OTHER (OUTPATIENT)
Age: 83
End: 2018-08-02

## 2018-08-02 DIAGNOSIS — D49.1 NEOPLASM OF UNSPECIFIED BEHAVIOR OF RESPIRATORY SYSTEM: ICD-10-CM

## 2018-08-30 ENCOUNTER — APPOINTMENT (OUTPATIENT)
Dept: PULMONOLOGY | Facility: CLINIC | Age: 83
End: 2018-08-30
Payer: MEDICARE

## 2018-08-30 VITALS
SYSTOLIC BLOOD PRESSURE: 150 MMHG | RESPIRATION RATE: 16 BRPM | HEART RATE: 73 BPM | HEIGHT: 64 IN | BODY MASS INDEX: 33.46 KG/M2 | DIASTOLIC BLOOD PRESSURE: 80 MMHG | WEIGHT: 196 LBS | TEMPERATURE: 98.5 F

## 2018-08-30 DIAGNOSIS — R91.1 SOLITARY PULMONARY NODULE: ICD-10-CM

## 2018-08-30 DIAGNOSIS — E66.2 MORBID (SEVERE) OBESITY WITH ALVEOLAR HYPOVENTILATION: ICD-10-CM

## 2018-08-30 DIAGNOSIS — D49.1 NEOPLASM OF UNSPECIFIED BEHAVIOR OF RESPIRATORY SYSTEM: ICD-10-CM

## 2018-08-30 DIAGNOSIS — R06.02 SHORTNESS OF BREATH: ICD-10-CM

## 2018-08-30 DIAGNOSIS — J44.9 CHRONIC OBSTRUCTIVE PULMONARY DISEASE, UNSPECIFIED: ICD-10-CM

## 2018-08-30 PROCEDURE — 99214 OFFICE O/P EST MOD 30 MIN: CPT

## 2018-08-30 RX ORDER — FLUTICASONE PROPIONATE AND SALMETEROL 50; 250 UG/1; UG/1
250-50 POWDER RESPIRATORY (INHALATION)
Qty: 60 | Refills: 0 | Status: COMPLETED | COMMUNITY
Start: 2018-06-14 | End: 2018-08-30

## 2018-08-31 ENCOUNTER — MEDICATION RENEWAL (OUTPATIENT)
Age: 83
End: 2018-08-31

## 2018-08-31 RX ORDER — FLUTICASONE PROPIONATE AND SALMETEROL 50; 500 UG/1; UG/1
500-50 POWDER RESPIRATORY (INHALATION)
Qty: 1 | Refills: 6 | Status: ACTIVE | COMMUNITY
Start: 2018-03-14 | End: 1900-01-01

## 2018-08-31 RX ORDER — ALBUTEROL SULFATE 90 UG/1
108 (90 BASE) AEROSOL, METERED RESPIRATORY (INHALATION)
Qty: 3 | Refills: 6 | Status: ACTIVE | COMMUNITY
Start: 2018-04-04

## 2018-09-04 ENCOUNTER — MEDICATION RENEWAL (OUTPATIENT)
Age: 83
End: 2018-09-04

## 2018-09-04 RX ORDER — ALBUTEROL SULFATE 2.5 MG/3ML
(2.5 MG/3ML) SOLUTION RESPIRATORY (INHALATION)
Qty: 1 | Refills: 6 | Status: ACTIVE | COMMUNITY
Start: 2018-08-30 | End: 1900-01-01

## 2018-09-07 ENCOUNTER — MEDICATION RENEWAL (OUTPATIENT)
Age: 83
End: 2018-09-07

## 2018-09-07 DIAGNOSIS — F41.9 ANXIETY DISORDER, UNSPECIFIED: ICD-10-CM

## 2018-09-07 RX ORDER — ALPRAZOLAM 0.25 MG/1
0.25 TABLET ORAL
Qty: 30 | Refills: 0 | Status: ACTIVE | COMMUNITY
Start: 2018-06-28 | End: 1900-01-01

## 2018-09-24 RX ORDER — IBUPROFEN 800 MG/1
800 TABLET, FILM COATED ORAL
Qty: 20 | Refills: 0 | Status: DISCONTINUED | COMMUNITY
Start: 2018-07-13 | End: 2018-09-24

## 2018-10-05 ENCOUNTER — OTHER (OUTPATIENT)
Age: 83
End: 2018-10-05

## 2018-10-21 ENCOUNTER — INPATIENT (INPATIENT)
Facility: HOSPITAL | Age: 83
LOS: 4 days | Discharge: HOME HEALTH | End: 2018-10-26
Attending: STUDENT IN AN ORGANIZED HEALTH CARE EDUCATION/TRAINING PROGRAM | Admitting: STUDENT IN AN ORGANIZED HEALTH CARE EDUCATION/TRAINING PROGRAM
Payer: MEDICARE

## 2018-10-21 VITALS
DIASTOLIC BLOOD PRESSURE: 75 MMHG | RESPIRATION RATE: 18 BRPM | TEMPERATURE: 98 F | OXYGEN SATURATION: 87 % | SYSTOLIC BLOOD PRESSURE: 148 MMHG | HEART RATE: 76 BPM

## 2018-10-21 DIAGNOSIS — R26.81 UNSTEADINESS ON FEET: ICD-10-CM

## 2018-10-21 DIAGNOSIS — G93.6 CEREBRAL EDEMA: ICD-10-CM

## 2018-10-21 LAB
ALBUMIN SERPL ELPH-MCNC: 3.9 G/DL — SIGNIFICANT CHANGE UP (ref 3.3–5)
ALP SERPL-CCNC: 99 U/L — SIGNIFICANT CHANGE UP (ref 40–120)
ALT FLD-CCNC: 15 U/L — SIGNIFICANT CHANGE UP (ref 4–33)
APTT BLD: 47.4 SEC — HIGH (ref 27.5–37.4)
AST SERPL-CCNC: 17 U/L — SIGNIFICANT CHANGE UP (ref 4–32)
BASE EXCESS BLDV CALC-SCNC: 6 MMOL/L — SIGNIFICANT CHANGE UP
BASOPHILS # BLD AUTO: 0.03 K/UL — SIGNIFICANT CHANGE UP (ref 0–0.2)
BASOPHILS NFR BLD AUTO: 0.5 % — SIGNIFICANT CHANGE UP (ref 0–2)
BILIRUB SERPL-MCNC: 0.2 MG/DL — SIGNIFICANT CHANGE UP (ref 0.2–1.2)
BLOOD GAS VENOUS - CREATININE: 1.05 MG/DL — SIGNIFICANT CHANGE UP (ref 0.5–1.3)
BUN SERPL-MCNC: 20 MG/DL — SIGNIFICANT CHANGE UP (ref 7–23)
CALCIUM SERPL-MCNC: 9.3 MG/DL — SIGNIFICANT CHANGE UP (ref 8.4–10.5)
CHLORIDE BLDV-SCNC: 108 MMOL/L — SIGNIFICANT CHANGE UP (ref 96–108)
CHLORIDE SERPL-SCNC: 102 MMOL/L — SIGNIFICANT CHANGE UP (ref 98–107)
CK MB BLD-MCNC: 1.85 NG/ML — SIGNIFICANT CHANGE UP (ref 1–4.7)
CK SERPL-CCNC: 77 U/L — SIGNIFICANT CHANGE UP (ref 25–170)
CO2 SERPL-SCNC: 28 MMOL/L — SIGNIFICANT CHANGE UP (ref 22–31)
CREAT SERPL-MCNC: 1.11 MG/DL — SIGNIFICANT CHANGE UP (ref 0.5–1.3)
EOSINOPHIL # BLD AUTO: 0.16 K/UL — SIGNIFICANT CHANGE UP (ref 0–0.5)
EOSINOPHIL NFR BLD AUTO: 2.6 % — SIGNIFICANT CHANGE UP (ref 0–6)
GAS PNL BLDV: 140 MMOL/L — SIGNIFICANT CHANGE UP (ref 136–146)
GLUCOSE BLDV-MCNC: 148 — HIGH (ref 70–99)
GLUCOSE SERPL-MCNC: 151 MG/DL — HIGH (ref 70–99)
HCO3 BLDV-SCNC: 28 MMOL/L — HIGH (ref 20–27)
HCT VFR BLD CALC: 37.7 % — SIGNIFICANT CHANGE UP (ref 34.5–45)
HCT VFR BLDV CALC: 39.2 % — SIGNIFICANT CHANGE UP (ref 34.5–45)
HGB BLD-MCNC: 12.1 G/DL — SIGNIFICANT CHANGE UP (ref 11.5–15.5)
HGB BLDV-MCNC: 12.8 G/DL — SIGNIFICANT CHANGE UP (ref 11.5–15.5)
IMM GRANULOCYTES # BLD AUTO: 0.02 # — SIGNIFICANT CHANGE UP
IMM GRANULOCYTES NFR BLD AUTO: 0.3 % — SIGNIFICANT CHANGE UP (ref 0–1.5)
INR BLD: 1.17 — SIGNIFICANT CHANGE UP (ref 0.88–1.17)
LACTATE BLDV-MCNC: 1.1 MMOL/L — SIGNIFICANT CHANGE UP (ref 0.5–2)
LYMPHOCYTES # BLD AUTO: 1.62 K/UL — SIGNIFICANT CHANGE UP (ref 1–3.3)
LYMPHOCYTES # BLD AUTO: 25.9 % — SIGNIFICANT CHANGE UP (ref 13–44)
MCHC RBC-ENTMCNC: 25.3 PG — LOW (ref 27–34)
MCHC RBC-ENTMCNC: 32.1 % — SIGNIFICANT CHANGE UP (ref 32–36)
MCV RBC AUTO: 78.9 FL — LOW (ref 80–100)
MONOCYTES # BLD AUTO: 0.62 K/UL — SIGNIFICANT CHANGE UP (ref 0–0.9)
MONOCYTES NFR BLD AUTO: 9.9 % — SIGNIFICANT CHANGE UP (ref 2–14)
NEUTROPHILS # BLD AUTO: 3.81 K/UL — SIGNIFICANT CHANGE UP (ref 1.8–7.4)
NEUTROPHILS NFR BLD AUTO: 60.8 % — SIGNIFICANT CHANGE UP (ref 43–77)
NRBC # FLD: 0 — SIGNIFICANT CHANGE UP
PCO2 BLDV: 57 MMHG — HIGH (ref 41–51)
PH BLDV: 7.36 PH — SIGNIFICANT CHANGE UP (ref 7.32–7.43)
PLATELET # BLD AUTO: 221 K/UL — SIGNIFICANT CHANGE UP (ref 150–400)
PMV BLD: 11.4 FL — SIGNIFICANT CHANGE UP (ref 7–13)
PO2 BLDV: 30 MMHG — LOW (ref 35–40)
POTASSIUM BLDV-SCNC: 3.6 MMOL/L — SIGNIFICANT CHANGE UP (ref 3.4–4.5)
POTASSIUM SERPL-MCNC: 3.8 MMOL/L — SIGNIFICANT CHANGE UP (ref 3.5–5.3)
POTASSIUM SERPL-SCNC: 3.8 MMOL/L — SIGNIFICANT CHANGE UP (ref 3.5–5.3)
PROT SERPL-MCNC: 8.3 G/DL — SIGNIFICANT CHANGE UP (ref 6–8.3)
PROTHROM AB SERPL-ACNC: 13 SEC — SIGNIFICANT CHANGE UP (ref 9.8–13.1)
RBC # BLD: 4.78 M/UL — SIGNIFICANT CHANGE UP (ref 3.8–5.2)
RBC # FLD: 15.6 % — HIGH (ref 10.3–14.5)
SAO2 % BLDV: 42.7 % — LOW (ref 60–85)
SODIUM SERPL-SCNC: 143 MMOL/L — SIGNIFICANT CHANGE UP (ref 135–145)
TROPONIN T, HIGH SENSITIVITY: 14 NG/L — SIGNIFICANT CHANGE UP (ref ?–14)
TROPONIN T, HIGH SENSITIVITY: 15 NG/L — SIGNIFICANT CHANGE UP (ref ?–14)
WBC # BLD: 6.26 K/UL — SIGNIFICANT CHANGE UP (ref 3.8–10.5)
WBC # FLD AUTO: 6.26 K/UL — SIGNIFICANT CHANGE UP (ref 3.8–10.5)

## 2018-10-21 PROCEDURE — 71046 X-RAY EXAM CHEST 2 VIEWS: CPT | Mod: 26

## 2018-10-21 PROCEDURE — 70450 CT HEAD/BRAIN W/O DYE: CPT | Mod: 26

## 2018-10-21 NOTE — CONSULT NOTE ADULT - PROBLEM SELECTOR RECOMMENDATION 9
Rule out mass lesion  Brain MRI with and without contrast  Patient is not likely to be an operative candidate

## 2018-10-21 NOTE — ED ADULT NURSE REASSESSMENT NOTE - NS ED NURSE REASSESS COMMENT FT1
Upon routine reassessment, pt is resting with family at bedside and appears in NAD at this time. Pt denies any complaints such as dizziness, lightheadedness, or SOB at this time.

## 2018-10-21 NOTE — ED PROVIDER NOTE - OBJECTIVE STATEMENT
PT Pt is an 86F with PMHx of COPD on home O2, HTN, HLD, CAD who presents with dizziness for four days. Daughters at bedside state pt first felt dizzy about four days ago. She describes a feeling of unsteadiness on her feet, feeling as if the room is going back and forth. It's worse when she goes from a seated to a standing position. When she feels this way she sits down and it will go away after several minutes. She denies HA, vision changes, F/C/N/V, CP/SOB, abd pain, syncopal episodes or falls. Pt has known lung mass which is reportedly inoperable.

## 2018-10-21 NOTE — ED PROVIDER NOTE - ATTENDING CONTRIBUTION TO CARE
Attending Statement: I have personally seen and examined this patient. I have fully participated in the care of this patient. I have reviewed all pertinent clinical information, including history physical exam, plan and the Resident's note and agree except as noted  87yo F hx of COPD on home oxygen, HLD, HTN, CAD, Lung mass not on chemo/radiation from home pw dizziness x three days. States she feels "unsteady, like  falling" "need to hold on" no fall/trauma. no focal weakness or numbness. no headache. no neck pain. no change in vision. no slurred speech. no chest pain no nausea/vomit no abdominal pain. no urinary complaints. Pt has been using walker at home, normally does not need walker. pt lives alone. Daughter at bedside.  Vital signs noted. sitting up, EOMI. no nystagmus. no facial asymmetry. no slurred speech. supple neck. no sign of facial trauma. normal S1-S2 coarse bs bl. no retractions. soft nontender abdomen. stable pelvis. no focal weakness. nl  bl hands. AO3 nl strength bl hands and lower ext. nl sensation of ext.   plan ekg, labs, ct head. ua, cxr, and re assess. dwain w pt and daughter.

## 2018-10-21 NOTE — ED PROVIDER NOTE - NS ED ROS FT
CONSTITUTIONAL: No weakness, fevers or chills  EYES/ENT: No visual changes;  +vertigo, No throat pain   NECK: No pain or stiffness  RESPIRATORY: +SOB, which is at baseline, No cough, wheezing, hemoptysis  CARDIOVASCULAR: No chest pain or palpitations  GASTROINTESTINAL: No abdominal or epigastric pain. No nausea, vomiting, or hematemesis; No diarrhea or constipation. No melena or hematochezia.  GENITOURINARY: No dysuria, frequency or hematuria  NEUROLOGICAL: No numbness or weakness  SKIN: No itching, burning, rashes, or lesions   All other review of systems is negative unless indicated above.

## 2018-10-21 NOTE — ED ADULT TRIAGE NOTE - CHIEF COMPLAINT QUOTE
Pt with copd/Lung cancer  02 dependent. Pt reports feeling dizzy x few day. Pt denies chest pain. Pts 02 sat 87% with oxygen in placed. Per pt this is base line.

## 2018-10-21 NOTE — ED ADULT NURSE NOTE - NSIMPLEMENTINTERV_GEN_ALL_ED
Implemented All Universal Safety Interventions:  [place] to call system. Call bell, personal items and telephone within reach. Instruct patient to call for assistance. Room bathroom lighting operational. Non-slip footwear when patient is off stretcher. Physically safe environment: no spills, clutter or unnecessary equipment. Stretcher in lowest position, wheels locked, appropriate side rails in place.

## 2018-10-21 NOTE — ED PROVIDER NOTE - PROGRESS NOTE DETAILS
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 stroke. admit to medicine. neurology consulted. family informed. pt admitted for further workup  to medicine. neurology and neurosurgery will follow   pt and family comfortable w plan.

## 2018-10-21 NOTE — ED PROVIDER NOTE - PHYSICAL EXAMINATION
Constitutional: NAD  Head: NC/AT  Eyes: PERRLA, EOMI, clear conjunctiva  ENT: no nasal discharge; no oropharyngeal erythema or exudates; dry oral mucosa  Neck: supple; no JVD or thyromegaly  Respiratory: faint wheeze in upper lung fields bilaterally; no W/R/R  Cardiac: +S1/S2; RRR; no M/R/G; PMI non-displaced  Gastrointestinal: soft, NT/ND; no rebound or guarding  Extremities: WWP, no clubbing or cyanosis; no peripheral edema  Vascular: 2+ radial, DP/PT pulses B/L  Lymphatic: no submandibular or cervical LAD  Neurologic: AAOx3; CNII-XII grossly intact

## 2018-10-21 NOTE — ED ADULT NURSE NOTE - OBJECTIVE STATEMENT
Pt received to rm #19. AAOx4, c/o feeling dizzy. Pt denies headache/cp/sob. Denies falls/injury. Pt has copd and lung cancer on home oxygen. Pt appears comfortable at this time. MD call performed. #20g IVSL placed to right ac, labs drawn and sent. Family members at bedside. no acute distress. Awaiting further plan of care.

## 2018-10-21 NOTE — ED ADULT NURSE REASSESSMENT NOTE - NS ED NURSE REASSESS COMMENT FT1
Pt back from CT. Pt appears in NAD at this time. Pt on 4L O2 and satting at 94%. Pt denies dizziness, N&V, SOB, chest pain, lightheadedness, changes in vision, of difficulty breathing at this time.  Skin is dry and intact. repeat Troponin sent as ordered.

## 2018-10-22 DIAGNOSIS — F41.9 ANXIETY DISORDER, UNSPECIFIED: ICD-10-CM

## 2018-10-22 DIAGNOSIS — Z90.710 ACQUIRED ABSENCE OF BOTH CERVIX AND UTERUS: Chronic | ICD-10-CM

## 2018-10-22 DIAGNOSIS — R51 HEADACHE: ICD-10-CM

## 2018-10-22 DIAGNOSIS — E11.9 TYPE 2 DIABETES MELLITUS WITHOUT COMPLICATIONS: ICD-10-CM

## 2018-10-22 DIAGNOSIS — Z29.9 ENCOUNTER FOR PROPHYLACTIC MEASURES, UNSPECIFIED: ICD-10-CM

## 2018-10-22 DIAGNOSIS — J44.9 CHRONIC OBSTRUCTIVE PULMONARY DISEASE, UNSPECIFIED: ICD-10-CM

## 2018-10-22 DIAGNOSIS — I10 ESSENTIAL (PRIMARY) HYPERTENSION: ICD-10-CM

## 2018-10-22 DIAGNOSIS — Z51.5 ENCOUNTER FOR PALLIATIVE CARE: ICD-10-CM

## 2018-10-22 DIAGNOSIS — R53.81 OTHER MALAISE: ICD-10-CM

## 2018-10-22 DIAGNOSIS — D49.1 NEOPLASM OF UNSPECIFIED BEHAVIOR OF RESPIRATORY SYSTEM: ICD-10-CM

## 2018-10-22 DIAGNOSIS — R42 DIZZINESS AND GIDDINESS: ICD-10-CM

## 2018-10-22 DIAGNOSIS — C34.90 MALIGNANT NEOPLASM OF UNSPECIFIED PART OF UNSPECIFIED BRONCHUS OR LUNG: ICD-10-CM

## 2018-10-22 PROCEDURE — 99223 1ST HOSP IP/OBS HIGH 75: CPT | Mod: GC

## 2018-10-22 PROCEDURE — 12345: CPT | Mod: NC

## 2018-10-22 RX ORDER — FOSINOPRIL SODIUM 10 MG/1
1 TABLET ORAL
Qty: 0 | Refills: 0 | COMMUNITY

## 2018-10-22 RX ORDER — DILTIAZEM HCL 120 MG
1 CAPSULE, EXT RELEASE 24 HR ORAL
Qty: 0 | Refills: 0 | COMMUNITY

## 2018-10-22 RX ORDER — ACETAMINOPHEN 500 MG
650 TABLET ORAL EVERY 6 HOURS
Qty: 0 | Refills: 0 | Status: DISCONTINUED | OUTPATIENT
Start: 2018-10-22 | End: 2018-10-26

## 2018-10-22 RX ORDER — ALBUTEROL 90 UG/1
3 AEROSOL, METERED ORAL
Qty: 0 | Refills: 0 | COMMUNITY

## 2018-10-22 RX ORDER — FLUTICASONE PROPIONATE AND SALMETEROL 50; 250 UG/1; UG/1
1 POWDER ORAL; RESPIRATORY (INHALATION)
Qty: 0 | Refills: 0 | COMMUNITY

## 2018-10-22 RX ORDER — SIMVASTATIN 20 MG/1
10 TABLET, FILM COATED ORAL AT BEDTIME
Qty: 0 | Refills: 0 | Status: DISCONTINUED | OUTPATIENT
Start: 2018-10-22 | End: 2018-10-26

## 2018-10-22 RX ORDER — BUDESONIDE AND FORMOTEROL FUMARATE DIHYDRATE 160; 4.5 UG/1; UG/1
2 AEROSOL RESPIRATORY (INHALATION)
Qty: 0 | Refills: 0 | Status: DISCONTINUED | OUTPATIENT
Start: 2018-10-22 | End: 2018-10-26

## 2018-10-22 RX ORDER — PANTOPRAZOLE SODIUM 20 MG/1
40 TABLET, DELAYED RELEASE ORAL DAILY
Qty: 0 | Refills: 0 | Status: DISCONTINUED | OUTPATIENT
Start: 2018-10-22 | End: 2018-10-25

## 2018-10-22 RX ORDER — LISINOPRIL 2.5 MG/1
40 TABLET ORAL DAILY
Qty: 0 | Refills: 0 | Status: DISCONTINUED | OUTPATIENT
Start: 2018-10-22 | End: 2018-10-26

## 2018-10-22 RX ORDER — GABAPENTIN 400 MG/1
300 CAPSULE ORAL THREE TIMES A DAY
Qty: 0 | Refills: 0 | Status: DISCONTINUED | OUTPATIENT
Start: 2018-10-22 | End: 2018-10-26

## 2018-10-22 RX ORDER — DEXAMETHASONE 0.5 MG/5ML
4 ELIXIR ORAL EVERY 6 HOURS
Qty: 0 | Refills: 0 | Status: DISCONTINUED | OUTPATIENT
Start: 2018-10-22 | End: 2018-10-25

## 2018-10-22 RX ORDER — ALPRAZOLAM 0.25 MG
0.25 TABLET ORAL THREE TIMES A DAY
Qty: 0 | Refills: 0 | Status: DISCONTINUED | OUTPATIENT
Start: 2018-10-22 | End: 2018-10-25

## 2018-10-22 RX ORDER — DILTIAZEM HCL 120 MG
240 CAPSULE, EXT RELEASE 24 HR ORAL DAILY
Qty: 0 | Refills: 0 | Status: DISCONTINUED | OUTPATIENT
Start: 2018-10-22 | End: 2018-10-26

## 2018-10-22 RX ORDER — IPRATROPIUM/ALBUTEROL SULFATE 18-103MCG
3 AEROSOL WITH ADAPTER (GRAM) INHALATION EVERY 6 HOURS
Qty: 0 | Refills: 0 | Status: DISCONTINUED | OUTPATIENT
Start: 2018-10-22 | End: 2018-10-26

## 2018-10-22 RX ORDER — FUROSEMIDE 40 MG
40 TABLET ORAL DAILY
Qty: 0 | Refills: 0 | Status: DISCONTINUED | OUTPATIENT
Start: 2018-10-22 | End: 2018-10-26

## 2018-10-22 RX ADMIN — PANTOPRAZOLE SODIUM 40 MILLIGRAM(S): 20 TABLET, DELAYED RELEASE ORAL at 17:44

## 2018-10-22 RX ADMIN — BUDESONIDE AND FORMOTEROL FUMARATE DIHYDRATE 2 PUFF(S): 160; 4.5 AEROSOL RESPIRATORY (INHALATION) at 21:16

## 2018-10-22 RX ADMIN — GABAPENTIN 300 MILLIGRAM(S): 400 CAPSULE ORAL at 13:19

## 2018-10-22 RX ADMIN — Medication 650 MILLIGRAM(S): at 23:34

## 2018-10-22 RX ADMIN — Medication 0.1 MILLIGRAM(S): at 05:12

## 2018-10-22 RX ADMIN — Medication 650 MILLIGRAM(S): at 06:32

## 2018-10-22 RX ADMIN — Medication 4 MILLIGRAM(S): at 23:34

## 2018-10-22 RX ADMIN — LISINOPRIL 40 MILLIGRAM(S): 2.5 TABLET ORAL at 05:12

## 2018-10-22 RX ADMIN — SIMVASTATIN 10 MILLIGRAM(S): 20 TABLET, FILM COATED ORAL at 21:16

## 2018-10-22 RX ADMIN — Medication 40 MILLIGRAM(S): at 09:56

## 2018-10-22 RX ADMIN — Medication 650 MILLIGRAM(S): at 05:40

## 2018-10-22 RX ADMIN — Medication 240 MILLIGRAM(S): at 05:12

## 2018-10-22 RX ADMIN — BUDESONIDE AND FORMOTEROL FUMARATE DIHYDRATE 2 PUFF(S): 160; 4.5 AEROSOL RESPIRATORY (INHALATION) at 09:56

## 2018-10-22 RX ADMIN — Medication 0.1 MILLIGRAM(S): at 13:19

## 2018-10-22 RX ADMIN — Medication 4 MILLIGRAM(S): at 17:44

## 2018-10-22 RX ADMIN — Medication 0.1 MILLIGRAM(S): at 23:33

## 2018-10-22 RX ADMIN — GABAPENTIN 300 MILLIGRAM(S): 400 CAPSULE ORAL at 05:12

## 2018-10-22 RX ADMIN — GABAPENTIN 300 MILLIGRAM(S): 400 CAPSULE ORAL at 21:16

## 2018-10-22 NOTE — H&P ADULT - NSHPREVIEWOFSYSTEMS_GEN_ALL_CORE
REVIEW OF SYSTEMS:  Constitutional: [ X] negative [ ] fevers [ ] chills [ ] weight loss [ ] weight gain  HEENT: [X ] negative [ ] dry eyes [ ] eye irritation [ ] postnasal drip [ ] nasal congestion  CV: [ x] negative  [ ] chest pain [ ] orthopnea [ ] palpitations [ ] murmur  Resp: [ ] negative [ ] cough [ x] shortness of breath [x ] dyspnea [ ] wheezing [ ] sputum [ ] hemoptysis  GI: [x ] negative [ ] nausea [ ] vomiting [ ] diarrhea [ ] constipation [ ] abd pain [ ] dysphagia   : [ x] negative [ ] dysuria [ ] nocturia [ ] hematuria [ ] increased urinary frequency  Musculoskeletal: [x ] negative [ ] back pain [ ] myalgias [ ] arthralgias [ ] fracture  Skin: [x ] negative [ ] rash [ ] itch  Neurological: [ ] negative [ ] headache [x ] dizziness [ ] syncope [ x] weakness [ ] numbness  Psychiatric: [x ] negative [ ] anxiety [ ] depression  Endocrine: [x ] negative [ ] diabetes [ ] thyroid problem  Hematologic/Lymphatic: [x ] negative [ ] anemia [ ] bleeding problem  Allergic/Immunologic: [x] negative [ ] itchy eyes [ ] nasal discharge [ ] hives [ ] angioedema  [ ] All other systems negative  [ ] Unable to assess ROS because ________

## 2018-10-22 NOTE — H&P ADULT - PROBLEM SELECTOR PLAN 5
Per patient not on antihyperglycemics. Last A1c 7.4 feb 2018.  -monitor glucose levels in hospital, will start SSI if needed Patient on 3 anti-hypertensive meds.  SBP in 160s  -continue home acei, clonidine, diltiazem, along with holding parameters  -f/u with repeat measurements

## 2018-10-22 NOTE — PROGRESS NOTE ADULT - SUBJECTIVE AND OBJECTIVE BOX
Patient is a 86y old  Female who presents with a chief complaint of Brain mass, Unsteady gait (22 Oct 2018 03:09)      SUBJECTIVE / OVERNIGHT EVENTS:  Patient seen and examined this morning. States that her symptoms have somewhat improved since admission. Denies fevers, chills or worsening pain. Awaiting MRI.      MEDICATIONS  (STANDING):  buDESOnide 160 MICROgram(s)/formoterol 4.5 MICROgram(s) Inhaler 2 Puff(s) Inhalation two times a day  cloNIDine 0.1 milliGRAM(s) Oral three times a day  dexamethasone  Injectable 4 milliGRAM(s) IV Push every 6 hours  diltiazem    milliGRAM(s) Oral daily  furosemide    Tablet 40 milliGRAM(s) Oral daily  gabapentin 300 milliGRAM(s) Oral three times a day  lisinopril 40 milliGRAM(s) Oral daily  pantoprazole  Injectable 40 milliGRAM(s) IV Push daily  simvastatin 10 milliGRAM(s) Oral at bedtime    MEDICATIONS  (PRN):  acetaminophen   Tablet .. 650 milliGRAM(s) Oral every 6 hours PRN Mild Pain (1 - 3)  ALBUTerol/ipratropium for Nebulization 3 milliLiter(s) Nebulizer every 6 hours PRN Shortness of Breath and/or Wheezing  ALPRAZolam 0.25 milliGRAM(s) Oral three times a day PRN severe anxiety      PHYSICAL EXAM:  T(C): 36.4 (10-22-18 @ 15:16), Max: 37.1 (10-22-18 @ 00:04)  HR: 81 (10-22-18 @ 15:16) (74 - 85)  BP: 124/57 (10-22-18 @ 15:16) (124/57 - 170/85)  RR: 20 (10-22-18 @ 15:16) (16 - 20)  SpO2: 99% (10-22-18 @ 15:16) (87% - 99%)  I&O's Summary    GENERAL: NAD, well-developed  HEAD:  Atraumatic, Normocephalic  EYES: EOMI, PERRLA, conjunctiva and sclera clear  NECK: Supple, No elevated JVD  CHEST/LUNG: Clear to auscultation bilaterally; No wheeze, On chronic O2   HEART: Regular rate and rhythm; No murmurs, rubs, or gallops  ABDOMEN: Soft, Nontender, Nondistended; Bowel sounds present  EXTREMITIES:  2+ Peripheral Pulses, No clubbing, cyanosis, or edema  PSYCH: AAOx3  NEUROLOGY: CN II-XII grossly intact, moving all extremities  SKIN: No rashes or lesions    LABS:  CAPILLARY BLOOD GLUCOSE      POCT Blood Glucose.: 134 mg/dL (21 Oct 2018 17:34)                          12.1   6.26  )-----------( 221      ( 21 Oct 2018 19:00 )             37.7     10-21    143  |  102  |  20  ----------------------------<  151<H>  3.8   |  28  |  1.11    Ca    9.3      21 Oct 2018 19:00    TPro  8.3  /  Alb  3.9  /  TBili  0.2  /  DBili  x   /  AST  17  /  ALT  15  /  AlkPhos  99  10-21    PT/INR - ( 21 Oct 2018 19:00 )   PT: 13.0 SEC;   INR: 1.17          PTT - ( 21 Oct 2018 19:00 )  PTT:47.4 SEC  CARDIAC MARKERS ( 21 Oct 2018 19:00 )  x     / x     / 77 u/L / 1.85 ng/mL / x              RADIOLOGY & ADDITIONAL TESTS:    Imaging Personally Reviewed, report below    < from: CT Head No Cont (10.21.18 @ 20:33) >      	IMPRESSION:  	No acute intracranial hemorrhage or midline shift.    	Broad-based subcortical hypodensity in the right cerebellum. This causes   	mass effect upon the fourth ventricle with effacement of ipsilateral   	basilar cistern. The findings are nonspecific, likely in keeping with   	metastatic disease or cerebral edema provided history of lung malignancy.   	Follow-up with contrast-enhanced MRI is recommended for further   	evaluation.    	These results were discussed via telephone at 10/21/2018 10:14 PM by Dr. Walker of radiology with Dr. Salomon, institution read-back verification   	policy was followed.       Consultant(s) Notes Reviewed:  Neurosurgery, neuro-onc     Care Discussed with Consultants/Other Providers: Patient care discussed with oncology and palliative care during interdisciplinary rounds, case management, nursing management  and social work were also present.

## 2018-10-22 NOTE — CONSULT NOTE ADULT - PROBLEM SELECTOR RECOMMENDATION 9
pt states she only recently found out about her nodule and was not going to do anything about it due to her poor lung function

## 2018-10-22 NOTE — H&P ADULT - PMH
CAD (coronary artery disease)    COPD (chronic obstructive pulmonary disease)    High cholesterol    HTN (hypertension)    Pulmonary neoplasm    Type II diabetes mellitus

## 2018-10-22 NOTE — PROGRESS NOTE ADULT - PROBLEM SELECTOR PLAN 1
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  -f/u neurosurgx recs Brain MRI with and without contrast  -fall precautions

## 2018-10-22 NOTE — H&P ADULT - PROBLEM SELECTOR PLAN 8
DVT: SCDs given likely met to brain  Diet: soft as patient adentulous  Dispo: etiology of mass and symptom improvement.

## 2018-10-22 NOTE — H&P ADULT - PROBLEM SELECTOR PLAN 4
Patient on 3 antihx, BP in 160s  -continue home acei, clonidine, diltiazem Patient on 4L NC at home with no signs of exacerbation  -continue on 4L NC  -duonebs as needed  -continue home inhaler->budesonide salmeterol.

## 2018-10-22 NOTE — H&P ADULT - HISTORY OF PRESENT ILLNESS
86 year old female with PMHx of COPD on home 4L O2, HTN, HLD, CAD, unresectable left lung mass who presents for dizziness that has been persisting x4 days. She states that the symptoms of disequilibrium started 4 days ago and was worse with going from sitting to standing that did not resolve with sitting. She did not have associated vertigo, visual changes, unilateral weakness, syncope, chest pain, palpitations, worsening SOB. N/V. Patient seen lying in bed with minimal symptoms but still felt unsteady with transfer of bed.   Patient is on 4L for COPD/lung mass and can usually walk to bathroom/dress/eat without SOB, no PND or orthopnea, no change in cough recently/ worsening GARCIA. Follow w/ Dr. Reynoso for COPD/lung mass and recent referral to [facility] however not accepted, per notes, due to insurance.   Patient seen in ED w/ CT head showing density in r cerebelum, seen by neuro surgx and neurology. 86 year old female with PMHx of COPD on home 4L O2, HTN, HLD, CAD, unresectable left lung mass who presents for dizziness that has been persisting x4 days. She states that the symptoms of disequilibrium started 4 days ago and was worse with going from sitting to standing that did not resolve with sitting. She did not have associated vertigo, visual changes, unilateral weakness, syncope, chest pain, palpitations, worsening SOB. N/V. Patient seen lying in bed with minimal symptoms but still felt unsteady with transfer of bed.    Patient is on 4L for COPD/lung mass and can usually walk to bathroom/dress/eat without SOB, no PND or orthopnea, no change in cough recently/ worsening GARCIA. Follow w/ Dr. Reynoso for COPD/lung mass and recent referral to [facility] however not accepted, per notes, due to insurance.     Patient seen in ED w/ CT head showing density in r cerebellum, seen by neuro surgx and neurology.

## 2018-10-22 NOTE — CONSULT NOTE ADULT - PROBLEM SELECTOR RECOMMENDATION 4
pt is dnr/dni  hcp is grand daughter  will need to see what RT says prior to discussing next steps like possibly hospice care at home

## 2018-10-22 NOTE — H&P ADULT - PROBLEM SELECTOR PLAN 2
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 Per patient, being evaluated for hospice and on discussion would like to be DNR/DNI but would also like temporizing treatment if possible.  -plan as above  -will inform Dr. Reynoso about admission  -DNR/DNI form filled out  -can consider palliative care consult if not already in the works with hospice care Patient unclear of onset, but thinks prior to disequilibrium  - Tylenol PRN  - ensure optimal hydration  - f/u for improvement

## 2018-10-22 NOTE — H&P ADULT - FAMILY HISTORY
No pertinent family history in first degree relatives No pertinent family history in first degree relatives, ~ no history of CAD, smoking, lung cancer reported

## 2018-10-22 NOTE — H&P ADULT - PROBLEM SELECTOR PLAN 1
Secondary to likely metastasis to right cerebellar hemisphere with continued deficits.  -f/u neurosurgx recs Brain MRI with and without contrast Presented with unsteady gait, worse from sitting to standing  Secondary to likely metastasis to right cerebellar hemisphere with continued deficits.  -f/u neurosurgx recs Brain MRI with and without contrast  -fall precautions

## 2018-10-22 NOTE — H&P ADULT - PROBLEM SELECTOR PLAN 6
DVT: SCDs given likely met to brain  Diet: soft as patient adentulous  Dispo: etiology of mass and symptom improvement. ISTOP  -will keep home alprazolam PRN, unlikely causing symptoms Per patient not on antihyperglycemics. Last A1c 7.4 feb 2018.  -monitor glucose levels in hospital, will start SSI if needed

## 2018-10-22 NOTE — H&P ADULT - NSHPSOCIALHISTORY_GEN_ALL_CORE
Lives at home alone with home health aid. 30+ pack year smoker. Non drinker/illicit drugs. Lives at home alone with home health aid.   30+ pack year smoker.   Non drinker/illicit drugs.

## 2018-10-22 NOTE — H&P ADULT - NSHPPHYSICALEXAM_GEN_ALL_CORE
Vital Signs Last 24 Hrs  T(C): 36.4 (22 Oct 2018 02:18), Max: 37.1 (22 Oct 2018 00:04)  T(F): 97.6 (22 Oct 2018 02:18), Max: 98.8 (22 Oct 2018 00:04)  HR: 85 (22 Oct 2018 02:18) (74 - 85)  BP: 166/92 (22 Oct 2018 02:18) (145/69 - 169/94)  BP(mean): --  RR: 20 (22 Oct 2018 02:18) (16 - 20)  SpO2: 98% (22 Oct 2018 02:18) (87% - 98%)  CAPILLARY BLOOD GLUCOSE      POCT Blood Glucose.: 134 mg/dL (21 Oct 2018 17:34)    I&O's Summary      PHYSICAL EXAM:  GENERAL: NAD, obese  HEAD:  Atraumatic, Normocephalic  EYES: EOMI, PERRLA, no nystagmus, conjunctiva and sclera clear  NECK: Supple, No JVD  CHEST/LUNG: coarse crackles left hemithorax, 96% on 4L NC, no respiratory distress/accessory muscle use  HEART: Regular rate and rhythm; No murmurs, rubs, or gallops  ABDOMEN: Soft, Nontender, Nondistended; Bowel sounds present  EXTREMITIES:  2+ Peripheral Pulses, No clubbing, cyanosis, or edema  PSYCH: AAOx3  NEUROLOGY: non-focal, 5/5 strength upper/lower extremities, no upper/lower extremity ataxia, unsteady when standing on feet  SKIN: No rashes or lesions Vital Signs Last 24 Hrs  T(C): 36.4 (22 Oct 2018 02:18), Max: 37.1 (22 Oct 2018 00:04)  T(F): 97.6 (22 Oct 2018 02:18), Max: 98.8 (22 Oct 2018 00:04)  HR: 85 (22 Oct 2018 02:18) (74 - 85)  BP: 166/92 (22 Oct 2018 02:18) (145/69 - 169/94)  BP(mean): --  RR: 20 (22 Oct 2018 02:18) (16 - 20)  SpO2: 98% (22 Oct 2018 02:18) (87% - 98%)  CAPILLARY BLOOD GLUCOSE      POCT Blood Glucose.: 134 mg/dL (21 Oct 2018 17:34)    I&O's Summary      PHYSICAL EXAM:  GENERAL: NAD, obese  HEAD:  Atraumatic, Normocephalic  EYES: EOMI, PERRL, no nystagmus, conjunctiva and sclera clear  NECK: Supple, No JVD  CHEST/LUNG: coarse crackles left hemithorax, 96% on 4L NC, no respiratory distress/accessory muscle use  HEART: Regular rate and rhythm; No murmurs, rubs, or gallops  ABDOMEN: Soft, Nontender, Nondistended; Bowel sounds present  EXTREMITIES:  2+ Peripheral Pulses, No clubbing, cyanosis, or edema  PSYCH: AAOx3  NEUROLOGY: non-focal, 5/5 strength upper/lower extremities, no upper/lower extremity ataxia, unsteady when standing on feet  SKIN: No rashes or lesions

## 2018-10-22 NOTE — H&P ADULT - NSHPLABSRESULTS_GEN_ALL_CORE
LABS:                        12.1   6.26  )-----------( 221      ( 21 Oct 2018 19:00 )             37.7     10-21    143  |  102  |  20  ----------------------------<  151<H>  3.8   |  28  |  1.11    Ca    9.3      21 Oct 2018 19:00    TPro  8.3  /  Alb  3.9  /  TBili  0.2  /  DBili  x   /  AST  17  /  ALT  15  /  AlkPhos  99  10-21    PT/INR - ( 21 Oct 2018 19:00 )   PT: 13.0 SEC;   INR: 1.17          PTT - ( 21 Oct 2018 19:00 )  PTT:47.4 SEC  CARDIAC MARKERS ( 21 Oct 2018 19:00 )  x     / x     / 77 u/L / 1.85 ng/mL / x    < from: CT Head No Cont (10.21.18 @ 20:33) >      IMPRESSION:  No acute intracranial hemorrhage or midline shift.    Broad-based subcortical hypodensity in the right cerebellum. This causes   mass effect upon the fourth ventricle with effacement of ipsilateral   basilar cistern. The findings are nonspecific, likely in keeping with   metastatic disease or cerebral edema provided history of lung malignancy.   Follow-up with contrast-enhanced MRI is recommended for further   evaluation.    These results were discussed via telephone at 10/21/2018 10:14 PM by Dr. Walker of radiology with Dr. Salomon, institution read-back verification   policy was followed.         < end of copied text >

## 2018-10-22 NOTE — H&P ADULT - ASSESSMENT
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

## 2018-10-22 NOTE — H&P ADULT - PROBLEM SELECTOR PLAN 7
DVT: SCDs given likely met to brain  Diet: soft as patient adentulous  Dispo: etiology of mass and symptom improvement. ISTOP  -will keep home alprazolam PRN, unlikely causing symptoms

## 2018-10-23 LAB
BUN SERPL-MCNC: 15 MG/DL — SIGNIFICANT CHANGE UP (ref 7–23)
CALCIUM SERPL-MCNC: 9.3 MG/DL — SIGNIFICANT CHANGE UP (ref 8.4–10.5)
CHLORIDE SERPL-SCNC: 101 MMOL/L — SIGNIFICANT CHANGE UP (ref 98–107)
CHOLEST SERPL-MCNC: 125 MG/DL — SIGNIFICANT CHANGE UP (ref 120–199)
CO2 SERPL-SCNC: 24 MMOL/L — SIGNIFICANT CHANGE UP (ref 22–31)
CREAT SERPL-MCNC: 0.7 MG/DL — SIGNIFICANT CHANGE UP (ref 0.5–1.3)
GLUCOSE SERPL-MCNC: 174 MG/DL — HIGH (ref 70–99)
HBA1C BLD-MCNC: 6.7 % — HIGH (ref 4–5.6)
HCT VFR BLD CALC: 38.6 % — SIGNIFICANT CHANGE UP (ref 34.5–45)
HDLC SERPL-MCNC: 49 MG/DL — SIGNIFICANT CHANGE UP (ref 45–65)
HGB BLD-MCNC: 12.5 G/DL — SIGNIFICANT CHANGE UP (ref 11.5–15.5)
LIPID PNL WITH DIRECT LDL SERPL: 70 MG/DL — SIGNIFICANT CHANGE UP
MAGNESIUM SERPL-MCNC: 2.1 MG/DL — SIGNIFICANT CHANGE UP (ref 1.6–2.6)
MCHC RBC-ENTMCNC: 25.5 PG — LOW (ref 27–34)
MCHC RBC-ENTMCNC: 32.4 % — SIGNIFICANT CHANGE UP (ref 32–36)
MCV RBC AUTO: 78.8 FL — LOW (ref 80–100)
NRBC # FLD: 0 — SIGNIFICANT CHANGE UP
PHOSPHATE SERPL-MCNC: 4.1 MG/DL — SIGNIFICANT CHANGE UP (ref 2.5–4.5)
PLATELET # BLD AUTO: 214 K/UL — SIGNIFICANT CHANGE UP (ref 150–400)
PMV BLD: 11.8 FL — SIGNIFICANT CHANGE UP (ref 7–13)
POTASSIUM SERPL-MCNC: 4.1 MMOL/L — SIGNIFICANT CHANGE UP (ref 3.5–5.3)
POTASSIUM SERPL-SCNC: 4.1 MMOL/L — SIGNIFICANT CHANGE UP (ref 3.5–5.3)
RBC # BLD: 4.9 M/UL — SIGNIFICANT CHANGE UP (ref 3.8–5.2)
RBC # FLD: 15.7 % — HIGH (ref 10.3–14.5)
SODIUM SERPL-SCNC: 141 MMOL/L — SIGNIFICANT CHANGE UP (ref 135–145)
TRIGL SERPL-MCNC: 48 MG/DL — SIGNIFICANT CHANGE UP (ref 10–149)
WBC # BLD: 4 K/UL — SIGNIFICANT CHANGE UP (ref 3.8–10.5)
WBC # FLD AUTO: 4 K/UL — SIGNIFICANT CHANGE UP (ref 3.8–10.5)

## 2018-10-23 PROCEDURE — 99232 SBSQ HOSP IP/OBS MODERATE 35: CPT | Mod: GC

## 2018-10-23 PROCEDURE — 99223 1ST HOSP IP/OBS HIGH 75: CPT | Mod: GC

## 2018-10-23 PROCEDURE — 99233 SBSQ HOSP IP/OBS HIGH 50: CPT

## 2018-10-23 RX ORDER — GLUCAGON INJECTION, SOLUTION 0.5 MG/.1ML
1 INJECTION, SOLUTION SUBCUTANEOUS ONCE
Qty: 0 | Refills: 0 | Status: DISCONTINUED | OUTPATIENT
Start: 2018-10-23 | End: 2018-10-26

## 2018-10-23 RX ORDER — DEXTROSE 50 % IN WATER 50 %
25 SYRINGE (ML) INTRAVENOUS ONCE
Qty: 0 | Refills: 0 | Status: DISCONTINUED | OUTPATIENT
Start: 2018-10-23 | End: 2018-10-26

## 2018-10-23 RX ORDER — INSULIN LISPRO 100/ML
VIAL (ML) SUBCUTANEOUS AT BEDTIME
Qty: 0 | Refills: 0 | Status: DISCONTINUED | OUTPATIENT
Start: 2018-10-23 | End: 2018-10-26

## 2018-10-23 RX ORDER — SODIUM CHLORIDE 9 MG/ML
1000 INJECTION, SOLUTION INTRAVENOUS
Qty: 0 | Refills: 0 | Status: DISCONTINUED | OUTPATIENT
Start: 2018-10-23 | End: 2018-10-26

## 2018-10-23 RX ORDER — DEXTROSE 50 % IN WATER 50 %
12.5 SYRINGE (ML) INTRAVENOUS ONCE
Qty: 0 | Refills: 0 | Status: DISCONTINUED | OUTPATIENT
Start: 2018-10-23 | End: 2018-10-26

## 2018-10-23 RX ORDER — INSULIN LISPRO 100/ML
VIAL (ML) SUBCUTANEOUS
Qty: 0 | Refills: 0 | Status: DISCONTINUED | OUTPATIENT
Start: 2018-10-23 | End: 2018-10-26

## 2018-10-23 RX ORDER — DEXTROSE 50 % IN WATER 50 %
15 SYRINGE (ML) INTRAVENOUS ONCE
Qty: 0 | Refills: 0 | Status: DISCONTINUED | OUTPATIENT
Start: 2018-10-23 | End: 2018-10-26

## 2018-10-23 RX ADMIN — PANTOPRAZOLE SODIUM 40 MILLIGRAM(S): 20 TABLET, DELAYED RELEASE ORAL at 14:00

## 2018-10-23 RX ADMIN — Medication 0.1 MILLIGRAM(S): at 13:59

## 2018-10-23 RX ADMIN — GABAPENTIN 300 MILLIGRAM(S): 400 CAPSULE ORAL at 22:02

## 2018-10-23 RX ADMIN — GABAPENTIN 300 MILLIGRAM(S): 400 CAPSULE ORAL at 06:51

## 2018-10-23 RX ADMIN — GABAPENTIN 300 MILLIGRAM(S): 400 CAPSULE ORAL at 14:00

## 2018-10-23 RX ADMIN — BUDESONIDE AND FORMOTEROL FUMARATE DIHYDRATE 2 PUFF(S): 160; 4.5 AEROSOL RESPIRATORY (INHALATION) at 09:43

## 2018-10-23 RX ADMIN — Medication 650 MILLIGRAM(S): at 00:20

## 2018-10-23 RX ADMIN — BUDESONIDE AND FORMOTEROL FUMARATE DIHYDRATE 2 PUFF(S): 160; 4.5 AEROSOL RESPIRATORY (INHALATION) at 22:01

## 2018-10-23 RX ADMIN — Medication 40 MILLIGRAM(S): at 09:42

## 2018-10-23 RX ADMIN — Medication 240 MILLIGRAM(S): at 06:51

## 2018-10-23 RX ADMIN — SIMVASTATIN 10 MILLIGRAM(S): 20 TABLET, FILM COATED ORAL at 22:02

## 2018-10-23 RX ADMIN — Medication 4 MILLIGRAM(S): at 13:59

## 2018-10-23 RX ADMIN — Medication 4 MILLIGRAM(S): at 17:51

## 2018-10-23 RX ADMIN — Medication 0.1 MILLIGRAM(S): at 22:02

## 2018-10-23 RX ADMIN — Medication 0.1 MILLIGRAM(S): at 06:52

## 2018-10-23 RX ADMIN — Medication 1: at 17:49

## 2018-10-23 RX ADMIN — Medication 4 MILLIGRAM(S): at 06:51

## 2018-10-23 RX ADMIN — LISINOPRIL 40 MILLIGRAM(S): 2.5 TABLET ORAL at 06:51
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
## 2018-10-23 NOTE — PROGRESS NOTE ADULT - SUBJECTIVE AND OBJECTIVE BOX
SUBJECTIVE AND OBJECTIVE:  pt sitting in chair eating lunch.  Denies any pain, sob  awaiting MRI to eval for RT   INTERVAL HPI/OVERNIGHT EVENTS: no change    DNR on chart: Yes    Allergies    penicillin (Unknown)    Intolerances    MEDICATIONS  (STANDING):  buDESOnide 160 MICROgram(s)/formoterol 4.5 MICROgram(s) Inhaler 2 Puff(s) Inhalation two times a day  cloNIDine 0.1 milliGRAM(s) Oral three times a day  dexamethasone  Injectable 4 milliGRAM(s) IV Push every 6 hours  dextrose 5%. 1000 milliLiter(s) (50 mL/Hr) IV Continuous <Continuous>  dextrose 50% Injectable 12.5 Gram(s) IV Push once  dextrose 50% Injectable 25 Gram(s) IV Push once  dextrose 50% Injectable 25 Gram(s) IV Push once  diltiazem    milliGRAM(s) Oral daily  furosemide    Tablet 40 milliGRAM(s) Oral daily  gabapentin 300 milliGRAM(s) Oral three times a day  insulin lispro (HumaLOG) corrective regimen sliding scale   SubCutaneous three times a day before meals  insulin lispro (HumaLOG) corrective regimen sliding scale   SubCutaneous at bedtime  lisinopril 40 milliGRAM(s) Oral daily  pantoprazole  Injectable 40 milliGRAM(s) IV Push daily  simvastatin 10 milliGRAM(s) Oral at bedtime    MEDICATIONS  (PRN):  acetaminophen   Tablet .. 650 milliGRAM(s) Oral every 6 hours PRN Mild Pain (1 - 3)  ALBUTerol/ipratropium for Nebulization 3 milliLiter(s) Nebulizer every 6 hours PRN Shortness of Breath and/or Wheezing  ALPRAZolam 0.25 milliGRAM(s) Oral three times a day PRN severe anxiety  dextrose 40% Gel 15 Gram(s) Oral once PRN Blood Glucose LESS THAN 70 milliGRAM(s)/deciliter  glucagon  Injectable 1 milliGRAM(s) IntraMuscular once PRN Glucose LESS THAN 70 milligrams/deciliter      ITEMS UNCHECKED ARE NOT PRESENT    PRESENT SYMPTOMS: [ ]Unable to obtain due to poor mentation   Source if other than patient:  [ ]Family   [ ]Team     Pain (Impact on QOL):  none  Location:  Minimal acceptable level (0-10 scale):            Aggravating factors:  Quality:  Radiation:  Severity (0-10 scale):    Timing:    Dyspnea:                           [ x]Mild [ ]Moderate [ ]Severe  Anxiety:                 none            [ ]Mild [ ]Moderate [ ]Severe  Fatigue:                none             [ ]Mild [ ]Moderate [ ]Severe  Nausea:                 none            [ ]Mild [ ]Moderate [ ]Severe  Loss of appetite:     none         [ ]Mild [ ]Moderate [ ]Severe  Constipation:          none          [ ]Mild [ ]Moderate [ ]Severe    PAIN AD Score:	  [email or web address] (Ctrl + left click to view)    Other Symptoms:  [ x]All other review of systems negative     Karnofsky Performance Score/Palliative Performance Status Version 2:      80   %    http://palliative.info/resource_material/[email or web address].pdf  PHYSICAL EXAM:  Vital Signs Last 24 Hrs  T(C): 36.7 (23 Oct 2018 15:01), Max: 36.7 (23 Oct 2018 15:01)  T(F): 98 (23 Oct 2018 15:01), Max: 98 (23 Oct 2018 15:01)  HR: 80 (23 Oct 2018 15:01) (77 - 80)  BP: 163/95 (23 Oct 2018 15:01) (139/85 - 164/76)  BP(mean): --  RR: 20 (23 Oct 2018 15:01) (20 - 20)  SpO2: 99% (23 Oct 2018 15:01) (95% - 99%) I&O's Summary   GENERAL:  [x ]Alert  [x ]Oriented x3   [ ]Lethargic  [ ]Cachexia  [ ]Unarousable  [ ]Verbal  [ ]Non-Verbal    Behavioral:   [ ] Anxiety  [ ] Delirium [ ] Agitation [ ] Other    HEENT:  [ x]Normal   [ ]Dry mouth   [ ]ET Tube/Trach  [ ]Oral lesions    PULMONARY:   [ ]Clear [ ]Tachypnea  [ ]Audible excessive secretions   [ ]Rhonchi        [ ]Right [ ]Left [ ]Bilateral  [ ]Crackles        [ ]Right [ ]Left [ ]Bilateral  [x ]Wheezing     [ ]Right [ ]Left [x ]Bilateral mild    CARDIOVASCULAR:    [x ]Regular [ ]Irregular [ ]Tachy  [ ]Lenard [ ]Murmur [ ]Other    GASTROINTESTINAL:  [ x]Soft  [ ]Distended   x[ ]+BS  [x ]Non tender [ ]Tender  [ ]PEG [ ]OGT/ NGT   Last BM:  GENITOURINARY:  [ ]Normal [ ] Incontinent   [ ]Oliguria/Anuria   [ ]Pacheco    MUSCULOSKELETAL:   [ ]Normal   [ x]Weakness  [ ]Bed/Wheelchair bound [ ]Edema    NEUROLOGIC:   [ x]No focal deficits  [ ] Cognitive impairment  [ ] Dysphagia [ ]Dysarthria [ ] Paresis [ ]Other     SKIN:   [ x]Normal   [ ]Pressure ulcer(s)  [ ]Rash    CRITICAL CARE:  [ ] Shock Present  [ ]Septic [ ]Cardiogenic [ ]Neurologic [ ]Hypovolemic  [ ]  Vasopressors [ ]  Inotropes   [ ] Respiratory failure present  [ ] Acute  [ ] Chronic [ ] Hypoxic  [ ] Hypercarbic [ ] Other  [ ] Other organ failure     LABS:                        12.5   4.00  )-----------( 214      ( 23 Oct 2018 05:58 )             38.6   10-23    141  |  101  |  15  ----------------------------<  174<H>  4.1   |  24  |  0.70    Ca    9.3      23 Oct 2018 05:58  Phos  4.1     10-23  Mg     2.1     10-23    TPro  8.3  /  Alb  3.9  /  TBili  0.2  /  DBili  x   /  AST  17  /  ALT  15  /  AlkPhos  99  10-21  PT/INR - ( 21 Oct 2018 19:00 )   PT: 13.0 SEC;   INR: 1.17          PTT - ( 21 Oct 2018 19:00 )  PTT:47.4 SEC      RADIOLOGY & ADDITIONAL STUDIES:    Protein Calorie Malnutrition Present: [ ] yes [ ] no  [ ] PPSV2 < or = 30%  [ ] significant weight loss [ ] poor nutritional intake [ ] anasarca [ ] catabolic state Albumin, Serum: 3.9 g/dL (10-21-18 @ 19:00)  Artificial Nutrition [ ]     REFERRALS:   [ ]Chaplaincy  [ ] Hospice  [ ]Child Life  [ ]Social Work  [ ]Case management [ ]Holistic Therapy   Goals of Care Document:

## 2018-10-23 NOTE — PROGRESS NOTE ADULT - SUBJECTIVE AND OBJECTIVE BOX
Patient is a 86y old  Female who presents with a chief complaint of Brain mass, Unsteady gait (23 Oct 2018 16:30)      SUBJECTIVE / OVERNIGHT EVENTS:  Patient seen and examined this morning. Her dizziness has subsided. Extensive conversation with two of the patient's daughters by phone, Jemma  and Yari Pacheco . As per Jemma, she had stated that she "doesn't want any doctors to provide her mom with false hope", and that her mother is "not a candidate for treatment". She also asked that I contact Yari and provide her an update as she would likely be the one to make decisions. As per Yari, she understood that there may be treatments that are done will palliative intent. Yari will come in from [place] at the end of the week.     MEDICATIONS  (STANDING):  buDESOnide 160 MICROgram(s)/formoterol 4.5 MICROgram(s) Inhaler 2 Puff(s) Inhalation two times a day  cloNIDine 0.1 milliGRAM(s) Oral three times a day  dexamethasone  Injectable 4 milliGRAM(s) IV Push every 6 hours  dextrose 5%. 1000 milliLiter(s) (50 mL/Hr) IV Continuous <Continuous>  dextrose 50% Injectable 12.5 Gram(s) IV Push once  dextrose 50% Injectable 25 Gram(s) IV Push once  dextrose 50% Injectable 25 Gram(s) IV Push once  diltiazem    milliGRAM(s) Oral daily  furosemide    Tablet 40 milliGRAM(s) Oral daily  gabapentin 300 milliGRAM(s) Oral three times a day  insulin lispro (HumaLOG) corrective regimen sliding scale   SubCutaneous three times a day before meals  insulin lispro (HumaLOG) corrective regimen sliding scale   SubCutaneous at bedtime  lisinopril 40 milliGRAM(s) Oral daily  pantoprazole  Injectable 40 milliGRAM(s) IV Push daily  simvastatin 10 milliGRAM(s) Oral at bedtime    MEDICATIONS  (PRN):  acetaminophen   Tablet .. 650 milliGRAM(s) Oral every 6 hours PRN Mild Pain (1 - 3)  ALBUTerol/ipratropium for Nebulization 3 milliLiter(s) Nebulizer every 6 hours PRN Shortness of Breath and/or Wheezing  ALPRAZolam 0.25 milliGRAM(s) Oral three times a day PRN severe anxiety  dextrose 40% Gel 15 Gram(s) Oral once PRN Blood Glucose LESS THAN 70 milliGRAM(s)/deciliter  glucagon  Injectable 1 milliGRAM(s) IntraMuscular once PRN Glucose LESS THAN 70 milligrams/deciliter      PHYSICAL EXAM:  T(C): 36.7 (10-23-18 @ 15:01), Max: 36.7 (10-23-18 @ 15:01)  HR: 80 (10-23-18 @ 15:01) (77 - 80)  BP: 163/95 (10-23-18 @ 15:01) (139/85 - 164/76)  RR: 20 (10-23-18 @ 15:01) (20 - 20)  SpO2: 99% (10-23-18 @ 15:01) (95% - 99%)  I&O's Summary        LABS:  CAPILLARY BLOOD GLUCOSE                              12.5   4.00  )-----------( 214      ( 23 Oct 2018 05:58 )             38.6     10-23    141  |  101  |  15  ----------------------------<  174<H>  4.1   |  24  |  0.70    Ca    9.3      23 Oct 2018 05:58  Phos  4.1     10-23  Mg     2.1     10-23    TPro  8.3  /  Alb  3.9  /  TBili  0.2  /  DBili  x   /  AST  17  /  ALT  15  /  AlkPhos  99  10-21    PT/INR - ( 21 Oct 2018 19:00 )   PT: 13.0 SEC;   INR: 1.17          PTT - ( 21 Oct 2018 19:00 )  PTT:47.4 SEC  CARDIAC MARKERS ( 21 Oct 2018 19:00 )  x     / x     / 77 u/L / 1.85 ng/mL / x              RADIOLOGY & ADDITIONAL TESTS:    Imaging Personally Reviewed:    Consultant(s) Notes Reviewed:      Care Discussed with Consultants/Other Providers: Patient care discussed with oncology and palliative care during interdisciplinary rounds, case management, nursing management  and social work were also present. Patient is a 86y old  Female who presents with a chief complaint of Brain mass, Unsteady gait (23 Oct 2018 16:30)      SUBJECTIVE / OVERNIGHT EVENTS:  Patient seen and examined this morning. Her dizziness has subsided. Extensive conversation with two of the patient's daughters by phone, Jemma  and Yari Pacheco . As per Jemma, she had stated that she "doesn't want any doctors to provide her mom with false hope", and that her mother is "not a candidate for treatment". She also asked that I contact Yari and provide her an update as she would likely be the one to make decisions. As per Yari, she understood that there may be treatments that are done will palliative intent. Yari will come in from [place] at the end of the week.     MEDICATIONS  (STANDING):  buDESOnide 160 MICROgram(s)/formoterol 4.5 MICROgram(s) Inhaler 2 Puff(s) Inhalation two times a day  cloNIDine 0.1 milliGRAM(s) Oral three times a day  dexamethasone  Injectable 4 milliGRAM(s) IV Push every 6 hours  dextrose 5%. 1000 milliLiter(s) (50 mL/Hr) IV Continuous <Continuous>  dextrose 50% Injectable 12.5 Gram(s) IV Push once  dextrose 50% Injectable 25 Gram(s) IV Push once  dextrose 50% Injectable 25 Gram(s) IV Push once  diltiazem    milliGRAM(s) Oral daily  furosemide    Tablet 40 milliGRAM(s) Oral daily  gabapentin 300 milliGRAM(s) Oral three times a day  insulin lispro (HumaLOG) corrective regimen sliding scale   SubCutaneous three times a day before meals  insulin lispro (HumaLOG) corrective regimen sliding scale   SubCutaneous at bedtime  lisinopril 40 milliGRAM(s) Oral daily  pantoprazole  Injectable 40 milliGRAM(s) IV Push daily  simvastatin 10 milliGRAM(s) Oral at bedtime    MEDICATIONS  (PRN):  acetaminophen   Tablet .. 650 milliGRAM(s) Oral every 6 hours PRN Mild Pain (1 - 3)  ALBUTerol/ipratropium for Nebulization 3 milliLiter(s) Nebulizer every 6 hours PRN Shortness of Breath and/or Wheezing  ALPRAZolam 0.25 milliGRAM(s) Oral three times a day PRN severe anxiety  dextrose 40% Gel 15 Gram(s) Oral once PRN Blood Glucose LESS THAN 70 milliGRAM(s)/deciliter  glucagon  Injectable 1 milliGRAM(s) IntraMuscular once PRN Glucose LESS THAN 70 milligrams/deciliter      PHYSICAL EXAM:  T(C): 36.7 (10-23-18 @ 15:01), Max: 36.7 (10-23-18 @ 15:01)  HR: 80 (10-23-18 @ 15:01) (77 - 80)  BP: 163/95 (10-23-18 @ 15:01) (139/85 - 164/76)  RR: 20 (10-23-18 @ 15:01) (20 - 20)  SpO2: 99% (10-23-18 @ 15:01) (95% - 99%)  I&O's Summary  GENERAL: NAD, well-developed  HEAD:  Atraumatic, Normocephalic  EYES: EOMI, PERRLA, conjunctiva and sclera clear  NECK: Supple, No elevated JVD  CHEST/LUNG: Clear to auscultation bilaterally; No wheeze, On chronic O2   HEART: Regular rate and rhythm; No murmurs, rubs, or gallops  ABDOMEN: Soft, Nontender, Nondistended; Bowel sounds present  PSYCH: AAOx3  NEUROLOGY: CN II-XII grossly intact, moving all extremities        LABS:  CAPILLARY BLOOD GLUCOSE                              12.5   4.00  )-----------( 214      ( 23 Oct 2018 05:58 )             38.6     10-23    141  |  101  |  15  ----------------------------<  174<H>  4.1   |  24  |  0.70    Ca    9.3      23 Oct 2018 05:58  Phos  4.1     10-23  Mg     2.1     10-23    TPro  8.3  /  Alb  3.9  /  TBili  0.2  /  DBili  x   /  AST  17  /  ALT  15  /  AlkPhos  99  10-21    PT/INR - ( 21 Oct 2018 19:00 )   PT: 13.0 SEC;   INR: 1.17          PTT - ( 21 Oct 2018 19:00 )  PTT:47.4 SEC  CARDIAC MARKERS ( 21 Oct 2018 19:00 )  x     / x     / 77 u/L / 1.85 ng/mL / x              RADIOLOGY & ADDITIONAL TESTS:    Imaging Personally Reviewed:    Consultant(s) Notes Reviewed:      Care Discussed with Consultants/Other Providers: Patient care discussed with oncology and palliative care during interdisciplinary rounds, case management, nursing management  and social work were also present.

## 2018-10-23 NOTE — CONSULT NOTE ADULT - ASSESSMENT
Patient is an 86 year old AA female with PMHx of COPD on home O2, HTN, HLD, CAD, left lung mass who presents for dizziness that has been persisting x4 days. She first began feeling dizzy 4 days prior to presentation, feels unsteady, and dizziness/unsteadiness is positional (worse when going from sitting to standing position). These sx have never happened before, she has no hx of stroke. She denies headache, tinnitus, vision changes, n/v, chest pain, SOB, syncope or presyncopal episodes, recent head trauma or falls. She usually ambulates with a walker, and is able to complete some ADLs but her daughters help her since she lives alone. She is not a candidate for operative management for lung mass because of poor functional status.   NIHSS 0, preMRS 3  Exam remarkable for unsteadiness and dizziness once patient sat up and attempted to stand.     Etiology: symptoms likely from subcortical hypodensity in right cerebellum, most likely secondary to metastatic lung Ca    Plan  [] agree with neurosurgery, MRI brain w, w/o to assess mass further  [] continue home medications other than ASA and xanax  [] keep patient normotensive  [] PT/OT  [] HgbA1C, lipid panel  [] DVT ppx with venodynes for now.     Thank you for the consult
85 YO female with an inoperable lung mass found to have a right cerebellar lucency suspicious for an underlying mass lesion
86 year old female with PMHx of COPD on home 4L O2, HTN, HLD, left lung mass who presented with persistent dizziness, found to have lesion in cerebellum suspicious for metastatic disease.    1) Brain lesion- given history, appears to have metastatic lung disease that has not metastasized to brain  - however, we do not have tissue diagnosis. Discussed possibility with patient but she would rather not pursue this option as she does not want to undergo systemic treatment for possible lung cancer  - she would be interested, however, in treating brain lesion with possible radiation as this may help with her symptoms and provide some quality of life  - will need to f/u MRI brain and consult radiation oncology to evaluate  - pt is realistic with goals; will need eventual family meeting to further clarify. She provided number for daughter Jemma ([phone]). Please let oncology team know if meeting is decided upon   - palliative care following, symptom management should be priority     Discussed with attending,  Angelita Killian, PGY-4  Hematology-Oncology Fellow  [phone] ([place]) [phone] ([facility])
87 yo female with lung cancer now with mets to cerebellum asked to see for symptom management

## 2018-10-23 NOTE — CONSULT NOTE ADULT - SUBJECTIVE AND OBJECTIVE BOX
HPI:  Patient is an 86 year old AA female with PMHx of COPD on home O2, HTN, HLD, CAD, left lung mass who presents for dizziness that has been persisting x4 days. She first began feeling dizzy 4 days prior to presentation, feels unsteady, and dizziness/unsteadiness is positional (worse when going from sitting to standing position). These sx have never happened before, she has no hx of stroke. She denies headache, tinnitus, vision changes, n/v, chest pain, SOB, syncope or presyncopal episodes, recent head trauma or falls. She usually ambulates with a walker, and is able to complete some ADLs but her daughters help her since she lives alone. She is not a candidate for operative management for lung mass because of poor functional status.   NIHSS 0, preMRS 3    PAST MEDICAL & SURGICAL HISTORY:  COPD (chronic obstructive pulmonary disease)  High cholesterol  HTN (hypertension)  No significant past surgical history    FAMILY HISTORY:  No pertinent family history in first degree relatives    Allergies  penicillin (Unknown)    Review of Systems:  CONSTITUTIONAL:  No weight loss, fever, chills, weakness or fatigue.  HEENT:  Eyes:  No visual loss, blurred vision, double vision or yellow sclerae. Ears, Nose, Throat:  No hearing loss, sneezing, congestion, runny nose or sore throat.  CARDIOVASCULAR:  No chest pain, chest pressure or chest discomfort. No palpitations or edema.  GASTROINTESTINAL:  No anorexia, nausea, vomiting or diarrhea. No abdominal pain or blood.  NEUROLOGICAL: See HPI  MUSCULOSKELETAL:  No muscle, back pain, joint pain or stiffness.  PSYCHIATRIC:  No history of depression or anxiety.    Vital Signs Last 24 Hrs  T(C): 36.1 (21 Oct 2018 21:09), Max: 36.7 (21 Oct 2018 17:35)  T(F): 97 (21 Oct 2018 21:09), Max: 98.1 (21 Oct 2018 17:35)  HR: 75 (21 Oct 2018 22:45) (75 - 81)  BP: 145/69 (21 Oct 2018 22:45) (145/69 - 169/94)  BP(mean): --  RR: 16 (21 Oct 2018 22:45) (16 - 20)  SpO2: 95% (21 Oct 2018 22:45) (87% - 95%)    General Exam:   General appearance: No acute distress                 Neurological Exam:  Mental Status: Orientated to self, date and place.    No dysarthria, aphasia or neglect.      Cranial Nerves: CN I - not tested.  PERRL, EOMI, VFF, no nystagmus or diplopia.  No APD.    Fundi not visualized bilaterally.    No facial asymmetry.  Hearing intact to finger rub bilaterally.     Motor:   Tone: normal.                  Strength:     [] Upper extremity                      Delt       Bicep    Tricep                                                  R         5/5 5/5        5/5       5/5                                               L          5/5 5/5 5/5 5/5  [] Lower extremity                       HF          KE          KF        DF         PF                                               R        5/5 5/5 5/5 5/5 5/5                                               L         5/5 5/5 5/5 5/5 5/5  Pronator drift: none                 Dysmetria: BL NL FTN  No truncal ataxia.    Tremor: No resting, postural or action tremor.  No myoclonus.    Sensation: intact to light touch    Deep Tendon Reflexes:   Right 2+ : BC, TC, BRD   Left 2+ : BC, TC, BRD  Right 2+ Knee, 1+ ankle  Left 2+ Knee, 1+ ankle    Toes flexor bilaterally  Gait: deferred - patient stood up and felt dizzy and unsteady, improved when she sat back down.     Other:  Radiology  CT head: No acute intracranial hemorrhage or midline shift.  Broad-based subcortical hypodensity in the right cerebellum. This causes   mass effect upon the fourth ventricle with effacement of ipsilateral   basilar cistern. The findings are nonspecific, likely in keeping with   metastatic disease or cerebral edema provided history of lung malignancy.   Follow-up with contrast-enhanced MRI is recommended for further   evaluation.
HPI:  86 year old female with PMHx of COPD on home 4L O2, HTN, HLD, CAD, unresectable left lung mass who presents for dizziness that has been persisting x4 days. She states that the symptoms of disequilibrium started 4 days ago and was worse with going from sitting to standing that did not resolve with sitting. She did not have associated vertigo, visual changes, unilateral weakness, syncope, chest pain, palpitations, worsening SOB. N/V. Patient seen lying in bed with minimal symptoms but still felt unsteady with transfer of bed.    Patient is on 4L for COPD/lung mass and can usually walk to bathroom/dress/eat without SOB, no PND or orthopnea, no change in cough recently/ worsening GARCIA. Follow w/ Dr. Reynoso for COPD/lung mass and recent referral to [facility] however not accepted, per notes, due to insurance.     Patient seen in ED w/ CT head showing density in r cerebellum, seen by neuro surgx and neurology. (22 Oct 2018 03:09)    Pt sitting in chair eating lunch.  Denies pain.  Complains of dizziness and weakness.  Pt lives alone and has an aide a few hours a few days a week.      PERTINENT PM/SXH:   CAD (coronary artery disease)  Pulmonary neoplasm  Type II diabetes mellitus  COPD (chronic obstructive pulmonary disease)  High cholesterol  HTN (hypertension)    H/O abdominal hysterectomy  No significant past surgical history    FAMILY HISTORY:  No pertinent family history in first degree relatives: ~ no history of CAD, smoking, lung cancer reported    ITEMS NOT CHECKED ARE NOT PRESENT    SOCIAL HISTORY:   Significant other/partner:  [ ]  Children:  [ x]  Jewish/Spirituality:  Substance hx:  [ ]   Tobacco hx:  [ ]   Alcohol hx: [ ]   Home Opioid hx:  [ ] I-Stop Reference No:  Living Situation: [ x]Home alone  [ ]Long term care  [ ]Rehab [ ]Other    ADVANCE DIRECTIVES:    DNR  Yes  MOLST  [ ]  Living Will  [ ]   DECISION MAKER(s):  [x ] Health Care Proxy(s)  [ ] Surrogate(s)  [ ] Guardian           Name(s): Phone Number(s): grand daughter    BASELINE (I)ADL(s) (prior to admission):  Friendly: [ ]Total  [x ] Moderate [ ]Dependent    Allergies    penicillin (Unknown)    Intolerances    MEDICATIONS  (STANDING):  buDESOnide 160 MICROgram(s)/formoterol 4.5 MICROgram(s) Inhaler 2 Puff(s) Inhalation two times a day  cloNIDine 0.1 milliGRAM(s) Oral three times a day  dexamethasone  Injectable 4 milliGRAM(s) IV Push every 6 hours  diltiazem    milliGRAM(s) Oral daily  furosemide    Tablet 40 milliGRAM(s) Oral daily  gabapentin 300 milliGRAM(s) Oral three times a day  lisinopril 40 milliGRAM(s) Oral daily  pantoprazole  Injectable 40 milliGRAM(s) IV Push daily  simvastatin 10 milliGRAM(s) Oral at bedtime    MEDICATIONS  (PRN):  acetaminophen   Tablet .. 650 milliGRAM(s) Oral every 6 hours PRN Mild Pain (1 - 3)  ALBUTerol/ipratropium for Nebulization 3 milliLiter(s) Nebulizer every 6 hours PRN Shortness of Breath and/or Wheezing  ALPRAZolam 0.25 milliGRAM(s) Oral three times a day PRN severe anxiety    PRESENT SYMPTOMS: [ ]Unable to obtain due to poor mentation   Source if other than patient:  [ ]Family   [ ]Team     Pain (Impact on QOL):  none  Location -         Minimal acceptable level (0-10 scale):                    Aggrevating factors -  Quality -  Radiation -  Severity (0-10 scale) -    Timing -    PAIN AD Score:     [email or web address] (press ctrl +  left click to view)    Dyspnea:           none                [ ]Mild [ ]Moderate [ ]Severe  Anxiety:               none              [ ]Mild [ ]Moderate [ ]Severe  Fatigue:                             [x ]Mild [ ]Moderate [ ]Severe  Nausea:              none               [ ]Mild [ ]Moderate [ ]Severe  Loss of appetite:     none         [ ]Mild [ ]Moderate [ ]Severe  Constipation:         none           [ ]Mild [ ]Moderate [ ]Severe    Other Symptoms:  [ x]All other review of systems negative     Karnofsky Performance Score/Palliative Performance Status Version 2:   70      %  PHYSICAL EXAM:  Vital Signs Last 24 Hrs  T(C): 36.4 (22 Oct 2018 15:16), Max: 37.1 (22 Oct 2018 00:04)  T(F): 97.6 (22 Oct 2018 15:16), Max: 98.8 (22 Oct 2018 00:04)  HR: 81 (22 Oct 2018 15:16) (74 - 85)  BP: 124/57 (22 Oct 2018 15:16) (124/57 - 170/85)  BP(mean): --  RR: 20 (22 Oct 2018 15:16) (16 - 20)  SpO2: 99% (22 Oct 2018 15:16) (87% - 99%) I&O's Summary  GENERAL:  [x ]Alert  [ x]Oriented x3   [ ]Lethargic  [ ]Cachexia  [ ]Unarousable  [ ]Verbal  [ ]Non-Verbal  Behavioral:   [ ] Anxiety  [ ] Delirium [ ] Agitation [ ] Other  HEENT:  [x ]Normal   [ ]Dry mouth   [ ]ET Tube/Trach  [ ]Oral lesions  PULMONARY:   [ ]Clear [ ]Tachypnea  [ ]Audible excessive secretions   [ ]Rhonchi        [ ]Right [ ]Left [ ]Bilateral  [ ]Crackles        [ ]Right [ ]Left [ ]Bilateral  [x ]Wheezing     [ ]Right [ ]Left [x ]Bilateral mild  CARDIOVASCULAR:    [ x]Regular [ ]Irregular [ ]Tachy  [ ]Lenard [ ]Murmur [ ]Other  GASTROINTESTINAL:  [ x]Soft  [ ]Distended   [x ]+BS  [ x]Non tender [ ]Tender  [ ]PEG [ ]OGT/ NGT  Last BM: GENITOURINARY:  [x ]Normal [ ] Incontinent   [ ]Oliguria/Anuria   [ ]Pacheco  MUSCULOSKELETAL:   [ ]Normal   [x ]Weakness  [ ]Bed/Wheelchair bound [ ]Edema  NEUROLOGIC:   [ x]No focal deficits  [ ] Cognitive impairment  [ ] Dysphagia [ ]Dysarthria [ ] Paresis [ ]Other   SKIN:   [x ]Normal   [ ]Pressure ulcer(s)  [ ]Rash    CRITICAL CARE:  [ ] Shock Present  [ ]Septic [ ]Cardiogenic [ ]Neurologic [ ]Hypovolemic  [ ]  Vasopressors [ ]  Inotropes   [ ] Respiratory failure present  [ ] Acute  [ ] Chronic [ ] Hypoxic  [ ] Hypercarbic [ ] Other  [ ] Other organ failure     LABS:                        12.1   6.26  )-----------( 221      ( 21 Oct 2018 19:00 )             37.7   10-21    143  |  102  |  20  ----------------------------<  151<H>  3.8   |  28  |  1.11    Ca    9.3      21 Oct 2018 19:00    TPro  8.3  /  Alb  3.9  /  TBili  0.2  /  DBili  x   /  AST  17  /  ALT  15  /  AlkPhos  99  10-21  PT/INR - ( 21 Oct 2018 19:00 )   PT: 13.0 SEC;   INR: 1.17          PTT - ( 21 Oct 2018 19:00 )  PTT:47.4 SEC      RADIOLOGY & ADDITIONAL STUDIES:    PROTEIN CALORIE MALNUTRITION:   [ ] PPSV2 < or = to 30% [ ] significant weight loss  [ ] poor nutritional intake [ ] catabolic state [ ] anasarca     Albumin, Serum: 3.9 g/dL (10-21-18 @ 19:00)  Artificial Nutrition [ ]     REFERRALS:   [ ]Chaplaincy  [ ] Hospice  [ ]Child Life  [ ]Social Work  [ ]Case management [ ]Holistic Therapy   Goals of Care Discussion Document:
HPI:  86 year old female with PMHx of COPD on home 4L O2, HTN, HLD, left lung mass who presented with persistent dizziness. Symptoms appear to come and go but were worsening prior to presentation. She usually performs full ADLs and lives by herself. No other neurologic symptoms including headache, weakness, sensory deficit, ataxia, n/v. She denies any dyspnea or pain. She follows with Dr. Reynoso from pulmonology and was noted to have left lung mass while being treated for pneumonia. She opted to decline a biopsy of lesion as she did not want to put her body through this. There have been ongoing goals of care discussion with possible transition to hospice. Here, CT head showed lesion in cerebellum, suspicious for metastatic disease. Currently, pt does not have any specific complaints.      PAST MEDICAL & SURGICAL HISTORY:  CAD (coronary artery disease)  Pulmonary neoplasm  Type II diabetes mellitus  COPD (chronic obstructive pulmonary disease)  High cholesterol  HTN (hypertension)  H/O abdominal hysterectomy      Allergies    penicillin (Unknown)    Intolerances        MEDICATIONS  (STANDING):  buDESOnide 160 MICROgram(s)/formoterol 4.5 MICROgram(s) Inhaler 2 Puff(s) Inhalation two times a day  cloNIDine 0.1 milliGRAM(s) Oral three times a day  dexamethasone  Injectable 4 milliGRAM(s) IV Push every 6 hours  dextrose 5%. 1000 milliLiter(s) (50 mL/Hr) IV Continuous <Continuous>  dextrose 50% Injectable 12.5 Gram(s) IV Push once  dextrose 50% Injectable 25 Gram(s) IV Push once  dextrose 50% Injectable 25 Gram(s) IV Push once  diltiazem    milliGRAM(s) Oral daily  furosemide    Tablet 40 milliGRAM(s) Oral daily  gabapentin 300 milliGRAM(s) Oral three times a day  insulin lispro (HumaLOG) corrective regimen sliding scale   SubCutaneous three times a day before meals  insulin lispro (HumaLOG) corrective regimen sliding scale   SubCutaneous at bedtime  lisinopril 40 milliGRAM(s) Oral daily  pantoprazole  Injectable 40 milliGRAM(s) IV Push daily  simvastatin 10 milliGRAM(s) Oral at bedtime    MEDICATIONS  (PRN):  acetaminophen   Tablet .. 650 milliGRAM(s) Oral every 6 hours PRN Mild Pain (1 - 3)  ALBUTerol/ipratropium for Nebulization 3 milliLiter(s) Nebulizer every 6 hours PRN Shortness of Breath and/or Wheezing  ALPRAZolam 0.25 milliGRAM(s) Oral three times a day PRN severe anxiety  dextrose 40% Gel 15 Gram(s) Oral once PRN Blood Glucose LESS THAN 70 milliGRAM(s)/deciliter  glucagon  Injectable 1 milliGRAM(s) IntraMuscular once PRN Glucose LESS THAN 70 milligrams/deciliter      FAMILY HISTORY:  No pertinent family history in first degree relatives: She does have hx of stroke and CAD in family       SOCIAL HISTORY: Former smoker         VITALS:   T(F): 98 (10-23-18 @ 15:01), Max: 98 (10-23-18 @ 15:01)  HR: 80 (10-23-18 @ 15:01)  BP: 163/95 (10-23-18 @ 15:01)  RR: 20 (10-23-18 @ 15:01)  SpO2: 99% (10-23-18 @ 15:01)  Wt(kg): --    PHYSICAL EXAM    GENERAL: NAD, elderly woman   HEAD:  Atraumatic, Normocephalic  EYES: EOMI, PERRLA, conjunctiva and sclera clear  NECK: Supple, No JVD  CHEST/LUNG: Clear to auscultation bilaterally; No wheezing   HEART: Regular rate and rhythm; No murmurs, rubs, or gallops  ABDOMEN: Soft, Nontender, Nondistended; Bowel sounds present  EXTREMITIES:  2+ Peripheral Pulses, No clubbing, cyanosis, or edema  NEUROLOGY: non-focal; 5/5 strength in all extremities. CN 2-12 intact . Did not assess gait    SKIN: No rashes or lesions    LABS:                         12.5   4.00  )-----------( 214      ( 23 Oct 2018 05:58 )             38.6     10-23    141  |  101  |  15  ----------------------------<  174<H>  4.1   |  24  |  0.70    Ca    9.3      23 Oct 2018 05:58  Phos  4.1     10-23  Mg     2.1     10-23    TPro  8.3  /  Alb  3.9  /  TBili  0.2  /  DBili  x   /  AST  17  /  ALT  15  /  AlkPhos  99  10-21    Phosphorus Level, Serum: 4.1 mg/dL (10-23 @ 05:58)  Magnesium, Serum: 2.1 mg/dL (10-23 @ 05:58)    PT/INR - ( 21 Oct 2018 19:00 )   PT: 13.0 SEC;   INR: 1.17          PTT - ( 21 Oct 2018 19:00 )  PTT:47.4 SEC      IMAGING:
Pt is an 86F with PMHx of COPD on home O2, HTN, HLD, CAD who presents with dizziness for four days. Daughters at bedside state pt first felt dizzy about four days ago. She describes a feeling of unsteadiness on her feet, feeling as if the room is going back and forth. It's worse when she goes from a seated to a standing position. When she feels this way she sits down and it will go away after several minutes. She denies HA, vision changes, F/C/N/V, CP/SOB, abd pain, syncopal episodes or falls. Pt has known left lung mass, she has been told that due to her poor medical status she is not a candidate for operative management.    WDWN female in NAD  Vital Signs Last 24 Hrs  T(C): 36.1 (21 Oct 2018 21:09), Max: 36.7 (21 Oct 2018 17:35)  T(F): 97 (21 Oct 2018 21:09), Max: 98.1 (21 Oct 2018 17:35)  HR: 75 (21 Oct 2018 22:45) (75 - 81)  BP: 145/69 (21 Oct 2018 22:45) (145/69 - 169/94)  BP(mean): --  RR: 16 (21 Oct 2018 22:45) (16 - 20)  SpO2: 95% (21 Oct 2018 22:45) (87% - 95%)    AAO X 3  PERRLA, EOMI  CN 2-12 grossly intact  STATON strength 5/5, no drift  Point to point coordination intact    < from: CT Head No Cont (10.21.18 @ 20:33) >  FINDINGS:  Limited evaluation secondary to streak artifact.     There is a broad-based subcortical hypodensity in the right cerebellum.   This causes mass effect upon the fourth ventricle with effacement of   ipsilateral basilar cistern. The findings are nonspecific, likely in   keeping with metastatic disease or cerebral edema provided history of   lung malignancy. Follow-up with contrast-enhanced MRI is recommended for   further evaluation.    No acute intracranial hemorrhage or midline shift.    There is mild cerebral volume loss with prominence of the ventricles and   sulci. There are patchy areas of low attenuation within the   periventricular and subcortical white matter which are nonspecific but   likely the sequela of chronic microvascular change. There is no CT   evidence of a large vascular territory infarct.    Calvarium appears unremarkable except for hyperostosis frontalis.   Calcified mucous retention cyst versus polyp in the right sphenoid sinus.   Mucosal thickening of the left maxillary sinus. The visualized   intraorbital compartments, remaining paranasal sinuses and mastoid   complexes are free of acute disease.    IMPRESSION:  No acute intracranial hemorrhage or midline shift.    Broad-based subcortical hypodensity in the right cerebellum. This causes   mass effect upon the fourth ventricle with effacement of ipsilateral   basilar cistern. The findings are nonspecific, likely in keeping with   metastatic disease or cerebral edema provided history of lung malignancy.   Follow-up with contrast-enhanced MRI is recommended for further   evaluation.    < end of copied text >

## 2018-10-23 NOTE — PROGRESS NOTE ADULT - PROBLEM SELECTOR PLAN 1
pt to have MRI  spoke with onc for possible eval  pt aware of her poor lung capacity and may not be a candidate for biopsy but would be interested in what she has to be offered

## 2018-10-24 ENCOUNTER — TRANSCRIPTION ENCOUNTER (OUTPATIENT)
Age: 83
End: 2018-10-24

## 2018-10-24 PROCEDURE — 99233 SBSQ HOSP IP/OBS HIGH 50: CPT | Mod: GC

## 2018-10-24 PROCEDURE — 70553 MRI BRAIN STEM W/O & W/DYE: CPT | Mod: 26

## 2018-10-24 PROCEDURE — 70548 MR ANGIOGRAPHY NECK W/DYE: CPT | Mod: 26

## 2018-10-24 PROCEDURE — 70544 MR ANGIOGRAPHY HEAD W/O DYE: CPT | Mod: 26,59

## 2018-10-24 PROCEDURE — 99233 SBSQ HOSP IP/OBS HIGH 50: CPT

## 2018-10-24 RX ADMIN — PANTOPRAZOLE SODIUM 40 MILLIGRAM(S): 20 TABLET, DELAYED RELEASE ORAL at 12:49

## 2018-10-24 RX ADMIN — Medication 240 MILLIGRAM(S): at 05:46

## 2018-10-24 RX ADMIN — GABAPENTIN 300 MILLIGRAM(S): 400 CAPSULE ORAL at 05:44

## 2018-10-24 RX ADMIN — Medication 1: at 08:58

## 2018-10-24 RX ADMIN — GABAPENTIN 300 MILLIGRAM(S): 400 CAPSULE ORAL at 22:45

## 2018-10-24 RX ADMIN — BUDESONIDE AND FORMOTEROL FUMARATE DIHYDRATE 2 PUFF(S): 160; 4.5 AEROSOL RESPIRATORY (INHALATION) at 08:59

## 2018-10-24 RX ADMIN — Medication 4 MILLIGRAM(S): at 12:49

## 2018-10-24 RX ADMIN — SIMVASTATIN 10 MILLIGRAM(S): 20 TABLET, FILM COATED ORAL at 22:45

## 2018-10-24 RX ADMIN — Medication 0.1 MILLIGRAM(S): at 05:44

## 2018-10-24 RX ADMIN — Medication 4 MILLIGRAM(S): at 05:44

## 2018-10-24 RX ADMIN — Medication 4 MILLIGRAM(S): at 00:57

## 2018-10-24 RX ADMIN — Medication 650 MILLIGRAM(S): at 00:57

## 2018-10-24 RX ADMIN — LISINOPRIL 40 MILLIGRAM(S): 2.5 TABLET ORAL at 05:44

## 2018-10-24 RX ADMIN — Medication 4 MILLIGRAM(S): at 23:58

## 2018-10-24 RX ADMIN — Medication 40 MILLIGRAM(S): at 05:44

## 2018-10-24 RX ADMIN — GABAPENTIN 300 MILLIGRAM(S): 400 CAPSULE ORAL at 15:30

## 2018-10-24 RX ADMIN — Medication 650 MILLIGRAM(S): at 01:50

## 2018-10-24 RX ADMIN — Medication 1: at 18:11

## 2018-10-24 RX ADMIN — BUDESONIDE AND FORMOTEROL FUMARATE DIHYDRATE 2 PUFF(S): 160; 4.5 AEROSOL RESPIRATORY (INHALATION) at 22:45

## 2018-10-24 RX ADMIN — Medication 0.1 MILLIGRAM(S): at 22:45

## 2018-10-24 RX ADMIN — Medication 0.1 MILLIGRAM(S): at 15:30

## 2018-10-24 RX ADMIN — Medication 4 MILLIGRAM(S): at 18:11

## 2018-10-24 NOTE — PROGRESS NOTE ADULT - SUBJECTIVE AND OBJECTIVE BOX
< from: MR Angio Head No Cont (10.24.18 @ 00:12) >  IMPRESSION:  Right cerebellar 2.1 x 2.1 x 2.0 cm mass consistent with metastatic   disease in this patient with known lung cancer. Mass effect upon the   fourth ventricle is seen without rostral hydrocephalus.  Moderate severity microvascular ischemic change.  Right frontal vertex 0.5 x 0.7 x 0.7 cm dural based enhancing mass which   may represent an incidental meningioma though a focus of dural based   metastatic disease cannot entirely be excluded.  Sclerotic focusalong the right frontal bone which may represent   sclerotic metastasis though a bone island cannot be excluded. Bone scan   imaging may be done for further evaluation.  Intracranial MR angiography demonstrates no significant stenosis,   evidence foraneurysm, or vascular malformation.  Extracranial MR angiography is technically limited.    < end of copied text >      MRI reviewed with Dr Hickey and notes from oncology also reviewed. Patient would be a candidate for Gamma Knife if RT/Oncology agree. Will follow.

## 2018-10-24 NOTE — DISCHARGE NOTE ADULT - PATIENT PORTAL LINK FT
You can access the [facility] Patient Portal, offered by [facility], by registering with the following website: [email or web address]

## 2018-10-24 NOTE — PROVIDER CONTACT NOTE (OTHER) - ACTION/TREATMENT ORDERED:
ALEXANDRE Pretty notified. No further orders given at this time. Morning BP meds to be administered. Will continue to monitor and assess.

## 2018-10-24 NOTE — PROGRESS NOTE ADULT - ATTENDING COMMENTS
I have seen and examined this patient with the stroke neurology team.     Overnight events were reviewed with the patient.   ROS: All negative except documented in the HPI.   Neurological exam was performed and agree with exam as documented above.   Laboratory results and imaging studies were reviewed by me.   I agree with the neurology resident note as documented above.    86 years old woman with multiple vascular risk factors including age, HPLD, CAD and lung mass is evaluated at the [facility] for dizziness-described as lightheadedness of at least 3-4 days duration. MRI brain showed a right cerebellar ring enhancing lesion with surrounding vasogenic cerebral edema out of proportion to the lesion concerning for brain metastasis.    Impression:  Right cerebellar metastasis, primary being lung mass with surrounding vasogenic edema, leading to effacement of the fourth ventricle without any evidence of hydrocephalus    Plan:  - Neurosurgery evaluation for optimal management of cerebellar mass  - Consider steroids for up to management of cerebral edema  - Further management is deferred to neurosurgery/oncologist  - Please call with questions    Above-mentioned plan was discussed with patient in detail. All the questions were on current concerns were addressed.

## 2018-10-24 NOTE — PROGRESS NOTE ADULT - SUBJECTIVE AND OBJECTIVE BOX
Neurology Follow up note    Subjective:Interval History - No events overnight, no acute complaints    Objective:   Vital Signs Last 24 Hrs  T(C): 36.9 (24 Oct 2018 10:31), Max: 36.9 (24 Oct 2018 10:31)  T(F): 98.4 (24 Oct 2018 10:31), Max: 98.4 (24 Oct 2018 10:31)  HR: 76 (24 Oct 2018 10:31) (74 - 88)  BP: 139/81 (24 Oct 2018 10:31) (139/81 - 178/105)  BP(mean): --  RR: 18 (24 Oct 2018 10:31) (18 - 18)  SpO2: 100% (24 Oct 2018 10:31) (97% - 100%)    General Exam:   General appearance: No acute distress                 Neurological Exam:  Mental Status: Orientated to self, date and place.  Attention intact.  No dysarthria, aphasia or neglect.      Cranial Nerves:  PERRL, EOMI, VFF, no nystagmus or diplopia.  No APD.    CN V1-3 intact to light touch and pinprick.  No facial asymmetry.  Hearing intact to finger rub bilaterally.  Tongue, uvula and palate midline.  Sternocleidomastoid and Trapezius intact bilaterally.    Motor:   Tone: normal.                  Strength: intact throughout  Pronator drift: none                 Dysmeria: None to finger-nose-finger    Tremor: No resting, postural or action tremor    Sensation: intact to light touch      10-23    141  |  101  |  15  ----------------------------<  174<H>  4.1   |  24  |  0.70    Ca    9.3      23 Oct 2018 05:58  Phos  4.1     10-23  Mg     2.1     10-23      10-23    141  |  101  |  15  ----------------------------<  174<H>  4.1   |  24  |  0.70    Ca    9.3      23 Oct 2018 05:58  Phos  4.1     10-23  Mg     2.1     10-23                              12.5   4.00  )-----------( 214      ( 23 Oct 2018 05:58 )             38.6     Radiology    < from: MR Head w/wo IV Cont (10.24.18 @ 00:11) >    IMPRESSION:  Right cerebellar 2.1 x 2.1 x 2.0 cm mass consistent with metastatic   disease in this patient with known lung cancer. Mass effect upon the   fourth ventricle is seen without rostral hydrocephalus.  Moderate severity microvascular ischemic change.  Right frontal vertex 0.5 x 0.7 x 0.7 cm dural based enhancing mass which   may represent an incidental meningioma though a focus of dural based   metastatic disease cannot entirely be excluded.  Sclerotic focusalong the right frontal bone which may represent   sclerotic metastasis though a bone island cannot be excluded. Bone scan   imaging may be done for further evaluation.  Intracranial MR angiography demonstrates no significant stenosis,   evidence foraneurysm, or vascular malformation.  Extracranial MR angiography is technically limited.      < end of copied text >      MEDICATIONS  (STANDING):  buDESOnide 160 MICROgram(s)/formoterol 4.5 MICROgram(s) Inhaler 2 Puff(s) Inhalation two times a day  cloNIDine 0.1 milliGRAM(s) Oral three times a day  dexamethasone  Injectable 4 milliGRAM(s) IV Push every 6 hours  dextrose 5%. 1000 milliLiter(s) (50 mL/Hr) IV Continuous <Continuous>  dextrose 50% Injectable 12.5 Gram(s) IV Push once  dextrose 50% Injectable 25 Gram(s) IV Push once  dextrose 50% Injectable 25 Gram(s) IV Push once  diltiazem    milliGRAM(s) Oral daily  furosemide    Tablet 40 milliGRAM(s) Oral daily  gabapentin 300 milliGRAM(s) Oral three times a day  insulin lispro (HumaLOG) corrective regimen sliding scale   SubCutaneous three times a day before meals  insulin lispro (HumaLOG) corrective regimen sliding scale   SubCutaneous at bedtime  lisinopril 40 milliGRAM(s) Oral daily  pantoprazole  Injectable 40 milliGRAM(s) IV Push daily  simvastatin 10 milliGRAM(s) Oral at bedtime    MEDICATIONS  (PRN):  acetaminophen   Tablet .. 650 milliGRAM(s) Oral every 6 hours PRN Mild Pain (1 - 3)  ALBUTerol/ipratropium for Nebulization 3 milliLiter(s) Nebulizer every 6 hours PRN Shortness of Breath and/or Wheezing  ALPRAZolam 0.25 milliGRAM(s) Oral three times a day PRN severe anxiety  dextrose 40% Gel 15 Gram(s) Oral once PRN Blood Glucose LESS THAN 70 milliGRAM(s)/deciliter  glucagon  Injectable 1 milliGRAM(s) IntraMuscular once PRN Glucose LESS THAN 70 milligrams/deciliter

## 2018-10-24 NOTE — DISCHARGE NOTE ADULT - MEDICATION SUMMARY - MEDICATIONS TO STOP TAKING
I will STOP taking the medications listed below when I get home from the hospital:    Aspir 81 81 mg oral tablet  -- 1 tab(s) by mouth once a day    predniSONE 20 mg oral tablet  -- 2 tab(s) by mouth once a day    oseltamivir 75 mg oral capsule  -- 1 cap(s) by mouth 2 times a day  Next dose due at 6pm    azithromycin 500 mg oral tablet  -- 1 tab(s) by mouth once a day   -- Do not take dairy products, antacids, or iron preparations within one hour of this medication.  Finish all this medication unless otherwise directed by prescriber.

## 2018-10-24 NOTE — DISCHARGE NOTE ADULT - PLAN OF CARE
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 Follow up with Dr Tadeo Clinton [phone]  for LINAC or Gamma Knife, which can be done at [address]  Continue decadron dosing, follow up with PCP for further management to taper off medications Continue current blood pressure medication regimen as directed. Monitor for any visual changes, headaches or dizziness.  Monitor blood pressure regularly.  Follow up with your PCP for further management for high blood pressure, please call to make appointment within 1 week of discharge MRI reflecting metastatic disease to brain. Follow up with Dr Tadeo Clinton [phone]  for LINAC or Gamma Knife, which can be done at [address]  Continue decadron dosing, follow up with PCP for further management to taper off medications Continue medications. Follow up with your PCP for further evaluation and management. Please call to make an appointment within 1-2 weeks of discharge. Sugars maybe higher due to decadron. Continue consistent carbohydrate diet.  Monitor blood glucose levels throughout the day before meals and at bedtime.  Record blood sugars and bring to outpatient providers appointment in order to be reviewed by your doctor for management modifications.  Be aware of diabetes management symptoms including feeling cool and clammy may be related to low glucose levels.  Feeling hot and dry may indicate high glucose levels.  If  you feel these symptoms, check your blood sugar.  Make regular podiatry appointments in order to have feet checked for wounds and toe nails cut by a doctor to prevent infections.

## 2018-10-24 NOTE — DISCHARGE NOTE ADULT - CARE PROVIDERS DIRECT ADDRESSES
[email or web address]

## 2018-10-24 NOTE — DISCHARGE NOTE ADULT - MEDICATION SUMMARY - MEDICATIONS TO TAKE
I will START or STAY ON the medications listed below when I get home from the hospital:    dexamethasone 4 mg oral tablet  -- 1 tab(s) by mouth every 6 hours  -- Indication: For Disequilibrium    fosinopril 40 mg oral tablet  -- 1 tab(s) by mouth once a day  -- Indication: For HTN (hypertension)    cloNIDine 0.1 mg oral tablet  -- 1 tab(s) by mouth 3 times a day  -- Indication: For HTN (hypertension)    DilTIAZem Hydrochloride  mg/24 hours oral capsule, extended release  -- 1 cap(s) by mouth once a day  -- Indication: For HTN (hypertension)    gabapentin 300 mg oral tablet  -- 1 tab(s) by mouth 3 times a day  -- Indication: For Type II diabetes mellitus    simvastatin 10 mg oral tablet  -- 1 tab(s) by mouth once a day (at bedtime)  -- Indication: For CAD (coronary artery disease)    ALPRAZolam 0.25 mg oral tablet  -- 1 tab(s) by mouth 3 times a day, As needed, severe anxiety  -- Indication: For Anxiety    albuterol 2.5 mg/3 mL (0.083%) inhalation solution  -- 3 milliliter(s) inhaled every 6 hours, As Needed  -- Indication: For COPD (chronic obstructive pulmonary disease)    fluticasone-salmeterol 250 mcg-50 mcg inhalation powder  -- 1 puff(s) inhaled 2 times a day  -- Indication: For COPD (chronic obstructive pulmonary disease)    Ventolin HFA CFC free 90 mcg/inh inhalation aerosol  -- 2 puff(s) inhaled 4 times a day  -- Indication: For COPD (chronic obstructive pulmonary disease)    furosemide 40 mg oral tablet  -- 1 tab(s) by mouth once a day  -- Indication: For CAD (coronary artery disease)    pantoprazole 40 mg oral delayed release tablet  -- 1 tab(s) by mouth once a day (before a meal)  -- Indication: For Need for prophylactic measure

## 2018-10-24 NOTE — DISCHARGE NOTE ADULT - SECONDARY DIAGNOSIS.
HTN (hypertension) Unsteady gait COPD (chronic obstructive pulmonary disease) Type II diabetes mellitus

## 2018-10-24 NOTE — DISCHARGE NOTE ADULT - HOME CARE AGENCY
[facility] [place] [facility] [place]  SOC 10/27/2018 Merged with [facility]  [facility] 10/27/2018

## 2018-10-24 NOTE — DISCHARGE NOTE ADULT - CARE PROVIDER_API CALL
Tadeo Clinton), Radiation Oncology  [address]  [place] [address]  Phone: [phone]  Fax: [phone]    werner nelson  Phone: (   )    -  Fax: (   )    -

## 2018-10-24 NOTE — PROGRESS NOTE ADULT - SUBJECTIVE AND OBJECTIVE BOX
Patient is a 86y old  Female who presents with a chief complaint of Brain mass, Unsteady gait (24 Oct 2018 11:41)      SUBJECTIVE / OVERNIGHT EVENTS:  Patient seen and examined this morning. She states that she still feels dizzy and also has an occasional accompanying headache. She denies any fevers, chills or shortness of breath. I had another extensive conversation with Yari, the patient's daughter and Yolanda, the patient grand-daughter who is also a PA. The details of the MRI were explained and they are amenable for radiation treatment if needed.     MEDICATIONS  (STANDING):  buDESOnide 160 MICROgram(s)/formoterol 4.5 MICROgram(s) Inhaler 2 Puff(s) Inhalation two times a day  cloNIDine 0.1 milliGRAM(s) Oral three times a day  dexamethasone  Injectable 4 milliGRAM(s) IV Push every 6 hours  dextrose 5%. 1000 milliLiter(s) (50 mL/Hr) IV Continuous <Continuous>  dextrose 50% Injectable 12.5 Gram(s) IV Push once  dextrose 50% Injectable 25 Gram(s) IV Push once  dextrose 50% Injectable 25 Gram(s) IV Push once  diltiazem    milliGRAM(s) Oral daily  furosemide    Tablet 40 milliGRAM(s) Oral daily  gabapentin 300 milliGRAM(s) Oral three times a day  insulin lispro (HumaLOG) corrective regimen sliding scale   SubCutaneous three times a day before meals  insulin lispro (HumaLOG) corrective regimen sliding scale   SubCutaneous at bedtime  lisinopril 40 milliGRAM(s) Oral daily  pantoprazole  Injectable 40 milliGRAM(s) IV Push daily  simvastatin 10 milliGRAM(s) Oral at bedtime    MEDICATIONS  (PRN):  acetaminophen   Tablet .. 650 milliGRAM(s) Oral every 6 hours PRN Mild Pain (1 - 3)  ALBUTerol/ipratropium for Nebulization 3 milliLiter(s) Nebulizer every 6 hours PRN Shortness of Breath and/or Wheezing  ALPRAZolam 0.25 milliGRAM(s) Oral three times a day PRN severe anxiety  dextrose 40% Gel 15 Gram(s) Oral once PRN Blood Glucose LESS THAN 70 milliGRAM(s)/deciliter  glucagon  Injectable 1 milliGRAM(s) IntraMuscular once PRN Glucose LESS THAN 70 milligrams/deciliter      PHYSICAL EXAM:  T(C): 36.9 (10-24-18 @ 10:31), Max: 36.9 (10-24-18 @ 10:31)  HR: 76 (10-24-18 @ 10:31) (74 - 88)  BP: 139/81 (10-24-18 @ 10:31) (139/81 - 178/105)  RR: 18 (10-24-18 @ 10:31) (18 - 20)  SpO2: 100% (10-24-18 @ 10:31) (97% - 100%)  I&O's Summary      GENERAL: NAD, well-developed  HEAD:  Atraumatic, Normocephalic  EYES: EOMI, PERRLA, conjunctiva and sclera clear  NECK: Supple, No elevated JVD  CHEST/LUNG: Clear to auscultation bilaterally; No wheeze, On chronic O2   HEART: Regular rate and rhythm; No murmurs, rubs, or gallops  ABDOMEN: Soft, Nontender, Nondistended; Bowel sounds present  PSYCH: AAOx3  NEUROLOGY: CN II-XII grossly intact, moving all extremities      LABS:  CAPILLARY BLOOD GLUCOSE      POCT Blood Glucose.: 135 mg/dL (24 Oct 2018 12:44)  POCT Blood Glucose.: 199 mg/dL (24 Oct 2018 08:39)  POCT Blood Glucose.: 138 mg/dL (23 Oct 2018 22:22)  POCT Blood Glucose.: 156 mg/dL (23 Oct 2018 17:34)                          12.5   4.00  )-----------( 214      ( 23 Oct 2018 05:58 )             38.6     10-23    141  |  101  |  15  ----------------------------<  174<H>  4.1   |  24  |  0.70    Ca    9.3      23 Oct 2018 05:58  Phos  4.1     10-23  Mg     2.1     10-23                RADIOLOGY & ADDITIONAL TESTS:    Imaging Personally Reviewed: MRI of the brain- masses/lesions noted in the right cerebella areas. Discussed in more details below    < from: MR Angio Head No Cont (10.24.18 @ 00:12) >  IMPRESSION:  Right cerebellar 2.1 x 2.1 x 2.0 cm mass consistent with metastatic   disease in this patient with known lung cancer. Mass effect upon the   fourth ventricle is seen without rostral hydrocephalus.  Moderate severity microvascular ischemic change.  Right frontal vertex 0.5 x 0.7 x 0.7 cm dural based enhancing mass which   may represent an incidental meningioma though a focus of dural based   metastatic disease cannot entirely be excluded.  Sclerotic focusalong the right frontal bone which may represent   sclerotic metastasis though a bone island cannot be excluded. Bone scan   imaging may be done for further evaluation.  Intracranial MR angiography demonstrates no significant stenosis,   evidence foraneurysm, or vascular malformation.  Extracranial MR angiography is technically limited.    Consultant(s) Notes Reviewed:      Care Discussed with Consultants/Other Providers: Patient care discussed with oncology and palliative care during interdisciplinary rounds, case management, nursing management  and social work were also present. Ms. Ge's case was also discussed with Dr. Guerra from radiation oncology.

## 2018-10-24 NOTE — PROGRESS NOTE ADULT - PROBLEM SELECTOR PLAN 1
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  -f/u neurosurgx recs Brain MRI with and without contrast, Gamma knife surgery suggested as per the results of imaging.  -fall precautions

## 2018-10-24 NOTE — PROVIDER CONTACT NOTE (OTHER) - BACKGROUND
Pt transported back from MRI at this time. C/o ringing in the ears from the machine and a mild headache towards posterior head.

## 2018-10-24 NOTE — DISCHARGE NOTE ADULT - HOSPITAL COURSE
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      Disequilibrium.   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  -RT- doesn't recommend RT, recommending radio surgery at [facility]  -fall precautions.     Acute nonintractable headache  - Patient unclear of onset, but thinks prior to disequilibrium  - Tylenol PRN  - ensure optimal hydration  - f/u for improvement.         COPD   Patient on 4L NC at home with no signs of exacerbation  -continue on 4L NC  -duonebs as needed  -continue home inhaler->budesonide salmeterol.      HTN (hypertension).   Patient on 3 anti-hypertensive meds.  SBP in 160s  -continue home acei, clonidine, diltiazem, along with holding parameters  -f/u with repeat measurements.     Type II diabetes mellitus.    Per patient not on antihyperglycemics. Last A1c 7.4 feb 2018.  -monitor glucose levels in hospital, will start SSI if needed.    Anxiety.   will keep home alprazolam PRN, unlikely causing symptoms.     stable for home with RW

## 2018-10-24 NOTE — DISCHARGE NOTE ADULT - CARE PLAN
Principal Discharge DX:	Malignant neoplasm of lung, unspecified laterality, unspecified part of lung  Goal:	follow up with radiosurgery  Assessment and plan of treatment:	MRI reflecting metastatic disease to brain. Follow up with Dr Tadeo Clinton [phone]  for LINAC or Gamma Knife, which can be done at [address]  Continue decadron dosing, follow up with PCP for further management to taper off medications  Secondary Diagnosis:	HTN (hypertension)  Assessment and plan of treatment:	Continue current blood pressure medication regimen as directed. Monitor for any visual changes, headaches or dizziness.  Monitor blood pressure regularly.  Follow up with your PCP for further management for high blood pressure, please call to make appointment within 1 week of discharge  Secondary Diagnosis:	Unsteady gait  Assessment and plan of treatment:	MRI reflecting metastatic disease to brain. Follow up with Dr Tadeo Clinton [phone]  for LINAC or Gamma Knife, which can be done at [address]  Continue decadron dosing, follow up with PCP for further management to taper off medications  Secondary Diagnosis:	COPD (chronic obstructive pulmonary disease)  Assessment and plan of treatment:	Continue medications. Follow up with your PCP for further evaluation and management. Please call to make an appointment within 1-2 weeks of discharge.  Secondary Diagnosis:	Type II diabetes mellitus  Assessment and plan of treatment:	Sugars maybe higher due to decadron. Continue consistent carbohydrate diet.  Monitor blood glucose levels throughout the day before meals and at bedtime.  Record blood sugars and bring to outpatient providers appointment in order to be reviewed by your doctor for management modifications.  Be aware of diabetes management symptoms including feeling cool and clammy may be related to low glucose levels.  Feeling hot and dry may indicate high glucose levels.  If  you feel these symptoms, check your blood sugar.  Make regular podiatry appointments in order to have feet checked for wounds and toe nails cut by a doctor to prevent infections.

## 2018-10-25 PROCEDURE — 99233 SBSQ HOSP IP/OBS HIGH 50: CPT

## 2018-10-25 RX ORDER — FUROSEMIDE 40 MG
1 TABLET ORAL
Qty: 0 | Refills: 0 | COMMUNITY
Start: 2018-10-25

## 2018-10-25 RX ORDER — ALPRAZOLAM 0.25 MG
0.25 TABLET ORAL THREE TIMES A DAY
Qty: 0 | Refills: 0 | Status: DISCONTINUED | OUTPATIENT
Start: 2018-10-25 | End: 2018-10-26

## 2018-10-25 RX ORDER — DEXAMETHASONE 0.5 MG/5ML
1 ELIXIR ORAL
Qty: 120 | Refills: 0 | OUTPATIENT
Start: 2018-10-25 | End: 2018-11-23

## 2018-10-25 RX ORDER — ALPRAZOLAM 0.25 MG
1 TABLET ORAL
Qty: 0 | Refills: 0 | COMMUNITY

## 2018-10-25 RX ORDER — DEXAMETHASONE 0.5 MG/5ML
4 ELIXIR ORAL EVERY 6 HOURS
Qty: 0 | Refills: 0 | Status: DISCONTINUED | OUTPATIENT
Start: 2018-10-25 | End: 2018-10-26

## 2018-10-25 RX ORDER — PANTOPRAZOLE SODIUM 20 MG/1
1 TABLET, DELAYED RELEASE ORAL
Qty: 30 | Refills: 0 | OUTPATIENT
Start: 2018-10-25 | End: 2018-11-23

## 2018-10-25 RX ORDER — PANTOPRAZOLE SODIUM 20 MG/1
40 TABLET, DELAYED RELEASE ORAL
Qty: 0 | Refills: 0 | Status: DISCONTINUED | OUTPATIENT
Start: 2018-10-25 | End: 2018-10-26

## 2018-10-25 RX ORDER — SIMVASTATIN 20 MG/1
1 TABLET, FILM COATED ORAL
Qty: 0 | Refills: 0 | COMMUNITY
Start: 2018-10-25

## 2018-10-25 RX ORDER — ALPRAZOLAM 0.25 MG
1 TABLET ORAL
Qty: 0 | Refills: 0 | COMMUNITY
Start: 2018-10-25

## 2018-10-25 RX ADMIN — Medication 0.1 MILLIGRAM(S): at 21:28

## 2018-10-25 RX ADMIN — Medication 4 MILLIGRAM(S): at 17:31

## 2018-10-25 RX ADMIN — Medication 40 MILLIGRAM(S): at 06:11

## 2018-10-25 RX ADMIN — BUDESONIDE AND FORMOTEROL FUMARATE DIHYDRATE 2 PUFF(S): 160; 4.5 AEROSOL RESPIRATORY (INHALATION) at 09:20

## 2018-10-25 RX ADMIN — GABAPENTIN 300 MILLIGRAM(S): 400 CAPSULE ORAL at 21:17

## 2018-10-25 RX ADMIN — GABAPENTIN 300 MILLIGRAM(S): 400 CAPSULE ORAL at 06:10

## 2018-10-25 RX ADMIN — Medication 4 MILLIGRAM(S): at 06:10

## 2018-10-25 RX ADMIN — LISINOPRIL 40 MILLIGRAM(S): 2.5 TABLET ORAL at 06:11

## 2018-10-25 RX ADMIN — Medication 0.1 MILLIGRAM(S): at 16:26

## 2018-10-25 RX ADMIN — Medication 4 MILLIGRAM(S): at 23:53

## 2018-10-25 RX ADMIN — SIMVASTATIN 10 MILLIGRAM(S): 20 TABLET, FILM COATED ORAL at 21:17

## 2018-10-25 RX ADMIN — Medication 4 MILLIGRAM(S): at 11:12

## 2018-10-25 RX ADMIN — Medication 240 MILLIGRAM(S): at 06:11

## 2018-10-25 RX ADMIN — BUDESONIDE AND FORMOTEROL FUMARATE DIHYDRATE 2 PUFF(S): 160; 4.5 AEROSOL RESPIRATORY (INHALATION) at 21:17

## 2018-10-25 RX ADMIN — GABAPENTIN 300 MILLIGRAM(S): 400 CAPSULE ORAL at 16:26

## 2018-10-25 RX ADMIN — Medication 0.1 MILLIGRAM(S): at 06:10

## 2018-10-25 NOTE — CHART NOTE - NSCHARTNOTEFT_GEN_A_CORE
MRI reviewed, I would not recommend whole brain RT for a solitary parenchymal lesion- recommendation would be radiosurgery , either with LINAC or Gamma Knife, which can be done at [facility] with my colleague Dr Tadeo Clinton    [phone] is the department number MRI reviewed, the lesion certainly appears to be a solitary cerebellar meet- I would not recommend whole brain RT for a solitary parenchymal lesion- despite lack of pathology, recommendation would be radiosurgery , either with LINAC or Gamma Knife, which can be done at [facility] with my colleague Dr Tadeo Clinton    [phone] is the department number

## 2018-10-25 NOTE — PROGRESS NOTE ADULT - SUBJECTIVE AND OBJECTIVE BOX
Patient is a 86y old  Female who presents with a chief complaint of Brain mass, Unsteady gait (24 Oct 2018 15:44)      SUBJECTIVE / OVERNIGHT EVENTS:  Patient seen and examined this morning. No new complaints at this time. She denies progressing dizziness, she also denies any shortness of breath, fevers, chills or chest pain.    MEDICATIONS  (STANDING):  buDESOnide 160 MICROgram(s)/formoterol 4.5 MICROgram(s) Inhaler 2 Puff(s) Inhalation two times a day  cloNIDine 0.1 milliGRAM(s) Oral three times a day  dexamethasone     Tablet 4 milliGRAM(s) Oral every 6 hours  dextrose 5%. 1000 milliLiter(s) (50 mL/Hr) IV Continuous <Continuous>  dextrose 50% Injectable 12.5 Gram(s) IV Push once  dextrose 50% Injectable 25 Gram(s) IV Push once  dextrose 50% Injectable 25 Gram(s) IV Push once  diltiazem    milliGRAM(s) Oral daily  furosemide    Tablet 40 milliGRAM(s) Oral daily  gabapentin 300 milliGRAM(s) Oral three times a day  insulin lispro (HumaLOG) corrective regimen sliding scale   SubCutaneous three times a day before meals  insulin lispro (HumaLOG) corrective regimen sliding scale   SubCutaneous at bedtime  lisinopril 40 milliGRAM(s) Oral daily  pantoprazole    Tablet 40 milliGRAM(s) Oral before breakfast  simvastatin 10 milliGRAM(s) Oral at bedtime    MEDICATIONS  (PRN):  acetaminophen   Tablet .. 650 milliGRAM(s) Oral every 6 hours PRN Mild Pain (1 - 3)  ALBUTerol/ipratropium for Nebulization 3 milliLiter(s) Nebulizer every 6 hours PRN Shortness of Breath and/or Wheezing  ALPRAZolam 0.25 milliGRAM(s) Oral three times a day PRN severe anxiety  dextrose 40% Gel 15 Gram(s) Oral once PRN Blood Glucose LESS THAN 70 milliGRAM(s)/deciliter  glucagon  Injectable 1 milliGRAM(s) IntraMuscular once PRN Glucose LESS THAN 70 milligrams/deciliter      PHYSICAL EXAM:  T(C): 36.4 (10-25-18 @ 13:40), Max: 36.7 (10-24-18 @ 15:23)  HR: 72 (10-25-18 @ 13:40) (61 - 88)  BP: 129/79 (10-25-18 @ 13:40) (129/79 - 162/83)  RR: 17 (10-25-18 @ 13:40) (17 - 18)  SpO2: 98% (10-25-18 @ 13:40) (98% - 100%)  I&O's Summary    GENERAL: NAD, well-developed  HEAD:  Atraumatic, Normocephalic  EYES: EOMI, PERRLA, conjunctiva and sclera clear  NECK: Supple, No elevated JVD  CHEST/LUNG: Clear to auscultation bilaterally; No wheeze, On chronic O2   HEART: Regular rate and rhythm; No murmurs, rubs, or gallops  ABDOMEN: Soft, Nontender, Nondistended; Bowel sounds present  PSYCH: AAOx3  NEUROLOGY: CN II-XII grossly intact, moving all extremities      LABS:  CAPILLARY BLOOD GLUCOSE      POCT Blood Glucose.: 140 mg/dL (25 Oct 2018 12:36)  POCT Blood Glucose.: 147 mg/dL (25 Oct 2018 08:30)  POCT Blood Glucose.: 155 mg/dL (24 Oct 2018 22:29)  POCT Blood Glucose.: 156 mg/dL (24 Oct 2018 17:58)                      RADIOLOGY & ADDITIONAL TESTS:    Imaging Personally Reviewed:    Consultant(s) Notes Reviewed:      Care Discussed with Consultants/Other Providers: Patient care discussed with oncology and palliative care during interdisciplinary rounds, case management, nursing management  and social work were also present. Case discussed with Dr. Guerra seperately as well.   Also spoke with Dr. Reynoso's office (primary pulmonologist), awaiting call back.

## 2018-10-25 NOTE — PROGRESS NOTE ADULT - PROBLEM SELECTOR PLAN 1
MRI confirms R. cerebellar mass.   Radonc suggests Gamma knife or LINAC  To follow up as outpatient MRI confirms R. cerebellar mass.   Radonc suggests Gamma knife or LINAC  To follow up as outpatient  Continue steroid treatment

## 2018-10-26 VITALS
TEMPERATURE: 97 F | DIASTOLIC BLOOD PRESSURE: 75 MMHG | RESPIRATION RATE: 18 BRPM | HEART RATE: 62 BPM | OXYGEN SATURATION: 96 % | SYSTOLIC BLOOD PRESSURE: 164 MMHG

## 2018-10-26 PROCEDURE — 99239 HOSP IP/OBS DSCHRG MGMT >30: CPT

## 2018-10-26 RX ORDER — INFLUENZA VIRUS VACCINE 15; 15; 15; 15 UG/.5ML; UG/.5ML; UG/.5ML; UG/.5ML
0.5 SUSPENSION INTRAMUSCULAR ONCE
Qty: 0 | Refills: 0 | Status: DISCONTINUED | OUTPATIENT
Start: 2018-10-26 | End: 2018-10-26

## 2018-10-26 RX ADMIN — Medication 1: at 08:46

## 2018-10-26 RX ADMIN — Medication 0.1 MILLIGRAM(S): at 06:04

## 2018-10-26 RX ADMIN — Medication 240 MILLIGRAM(S): at 06:04

## 2018-10-26 RX ADMIN — LISINOPRIL 40 MILLIGRAM(S): 2.5 TABLET ORAL at 06:04

## 2018-10-26 RX ADMIN — PANTOPRAZOLE SODIUM 40 MILLIGRAM(S): 20 TABLET, DELAYED RELEASE ORAL at 06:06

## 2018-10-26 RX ADMIN — Medication 4 MILLIGRAM(S): at 06:06

## 2018-10-26 RX ADMIN — Medication 4 MILLIGRAM(S): at 11:09

## 2018-10-26 RX ADMIN — GABAPENTIN 300 MILLIGRAM(S): 400 CAPSULE ORAL at 06:04

## 2018-10-26 RX ADMIN — BUDESONIDE AND FORMOTEROL FUMARATE DIHYDRATE 2 PUFF(S): 160; 4.5 AEROSOL RESPIRATORY (INHALATION) at 08:46

## 2018-10-26 NOTE — PROGRESS NOTE ADULT - PROBLEM SELECTOR PROBLEM 6
Type II diabetes mellitus

## 2018-10-26 NOTE — PROGRESS NOTE ADULT - PROBLEM SELECTOR PLAN 7
-will keep home alprazolam PRN, unlikely causing symptoms
ISTOP  -will keep home alprazolam PRN, unlikely causing symptoms

## 2018-10-26 NOTE — PROGRESS NOTE ADULT - PROBLEM SELECTOR PLAN 6
Per patient not on antihyperglycemics. Last A1c 7.4 feb 2018.  -Now on steroids, but sugars are still below 200  -Continue outpatient meds and monitor sugar as outpatient
Per patient not on antihyperglycemics. Last A1c 7.4 feb 2018.  -monitor glucose levels in hospital, will start SSI if needed

## 2018-10-26 NOTE — PROGRESS NOTE ADULT - REASON FOR ADMISSION
Brain mass, Unsteady gait

## 2018-10-26 NOTE — PROGRESS NOTE ADULT - PROBLEM SELECTOR PLAN 4
Patient on 4L NC at home with no signs of exacerbation  -continue on 4L NC  -duonebs as needed  -continue home inhaler->budesonide salmeterol.
pts' granddaughter is hcp
Patient on 4L NC at home with no signs of exacerbation  -continue on 4L NC  -duonebs as needed  -continue home inhaler->budesonide salmeterol.

## 2018-10-26 NOTE — PROGRESS NOTE ADULT - PROBLEM SELECTOR PROBLEM 1
Betzy
Malignant neoplasm of lung, unspecified laterality, unspecified part of lung
Betzy

## 2018-10-26 NOTE — PROGRESS NOTE ADULT - PROBLEM SELECTOR PROBLEM 2
Acute nonintractable headache, unspecified headache type
Betzy
Acute nonintractable headache, unspecified headache type

## 2018-10-26 NOTE — PROGRESS NOTE ADULT - PROBLEM SELECTOR PLAN 3
Daily extensive discussion held with patient's daughters as reported in the HPI  Will follow up with Dr. Guzman as outpatient
Extensive discussion held with patient's daughters as reported in the HPI
Extensive discussion held with patient's daughters as reported in the HPI
PT   pt lives alone and has limited care
Patient with unresectable lung neoplasm, no bx dx, now with likely metastatic disease to cerebellar area. Per patient, being evaluated for hospice and on discussion would like to be DNR/DNI but would also like temporizing treatment if possible.  -plan as above  -will inform Dr. Reynoso about admission  -DNR/DNI form filled out  -can consider palliative care consult if not already in the works with hospice care
Daily extensive discussion held with patient's daughters as reported in the HPI  Will follow up with Dr. Guzman as outpatient

## 2018-10-26 NOTE — PROGRESS NOTE ADULT - ASSESSMENT
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
86F with history of HLD, CAD, and lung cancer who initially presented to the hospital due to persistent dizziness times four days. CTH in the ED showed a hypodensity in the right cerebellum, that was concerning for possible metastatic disease given patient's history of lung cancer.     MRI brain with and without contrast was obtained for further evaluation and was significant for a right cerebellar 2.1 x 2.1 x 2.0 cm mass consistent with metastatic disease. There is some mass effect upon the fourth ventricle seen without rostral hydrocephalus. There is also sclerotic focus along the right frontal bone which may represent sclerotic metastasis through bone.     These findings were discussed with the patient at bedside. At this time, she says she wishes to be conservative with treatment and would like to speak further with her oncologist before making any decisions as to the next path of action. Neurosurgery is also following along with patient.     Recommendations:  Patient would like to discuss results further with her oncologist as an outpatient. She should also be given referral to see radiation oncology as an outpatient for evaluation of appropriateness of radiation therapy. Follow-up recommendations of neurosurgery team. Patient will be signed out to the general neurology consult team.
87 yo female with presumed met lung cancer asked to see for goc
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

## 2018-10-26 NOTE — PROGRESS NOTE ADULT - PROBLEM SELECTOR PLAN 1
MRI confirms R. cerebellar mass.   Radonc suggests Gamma knife or LINAC  To follow up as outpatient  Continue steroid treatment  D/C with outpatient follow up  Continue steroids for edema

## 2018-10-26 NOTE — PROGRESS NOTE ADULT - PROBLEM SELECTOR PLAN 2
Patient unclear of onset, but thinks prior to disequilibrium  - Tylenol PRN  - ensure optimal hydration  - f/u for improvement
steroids for edema  pt may require RT
Patient unclear of onset, but thinks prior to disequilibrium  - Tylenol PRN  - ensure optimal hydration  - f/u for improvement

## 2018-10-26 NOTE — PROGRESS NOTE ADULT - SUBJECTIVE AND OBJECTIVE BOX
Patient is a 86y old  Female who presents with a chief complaint of Brain mass, Unsteady gait (25 Oct 2018 15:16)      SUBJECTIVE / OVERNIGHT EVENTS:  Patient seen and examined this morning. No new complaints. Denies worsening in the dizziness or headaches. Looks forward to going home. No fevers or chills.      MEDICATIONS  (STANDING):  buDESOnide 160 MICROgram(s)/formoterol 4.5 MICROgram(s) Inhaler 2 Puff(s) Inhalation two times a day  cloNIDine 0.1 milliGRAM(s) Oral three times a day  dexamethasone     Tablet 4 milliGRAM(s) Oral every 6 hours  dextrose 5%. 1000 milliLiter(s) (50 mL/Hr) IV Continuous <Continuous>  dextrose 50% Injectable 12.5 Gram(s) IV Push once  dextrose 50% Injectable 25 Gram(s) IV Push once  dextrose 50% Injectable 25 Gram(s) IV Push once  diltiazem    milliGRAM(s) Oral daily  furosemide    Tablet 40 milliGRAM(s) Oral daily  gabapentin 300 milliGRAM(s) Oral three times a day  influenza   Vaccine 0.5 milliLiter(s) IntraMuscular once  insulin lispro (HumaLOG) corrective regimen sliding scale   SubCutaneous three times a day before meals  insulin lispro (HumaLOG) corrective regimen sliding scale   SubCutaneous at bedtime  lisinopril 40 milliGRAM(s) Oral daily  pantoprazole    Tablet 40 milliGRAM(s) Oral before breakfast  simvastatin 10 milliGRAM(s) Oral at bedtime    MEDICATIONS  (PRN):  acetaminophen   Tablet .. 650 milliGRAM(s) Oral every 6 hours PRN Mild Pain (1 - 3)  ALBUTerol/ipratropium for Nebulization 3 milliLiter(s) Nebulizer every 6 hours PRN Shortness of Breath and/or Wheezing  ALPRAZolam 0.25 milliGRAM(s) Oral three times a day PRN severe anxiety  dextrose 40% Gel 15 Gram(s) Oral once PRN Blood Glucose LESS THAN 70 milliGRAM(s)/deciliter  glucagon  Injectable 1 milliGRAM(s) IntraMuscular once PRN Glucose LESS THAN 70 milligrams/deciliter      PHYSICAL EXAM:  T(C): 36.3 (10-26-18 @ 06:12), Max: 36.5 (10-25-18 @ 21:27)  HR: 62 (10-26-18 @ 06:12) (62 - 70)  BP: 164/75 (10-26-18 @ 06:12) (140/77 - 164/75)  RR: 18 (10-26-18 @ 06:12) (18 - 18)  SpO2: 96% (10-26-18 @ 06:12) (96% - 98%)  I&O's Summary  GENERAL: NAD, well-developed  HEAD:  Atraumatic, Normocephalic  EYES: EOMI, PERRLA, conjunctiva and sclera clear  NECK: Supple, No elevated JVD  CHEST/LUNG: Clear to auscultation bilaterally; No wheeze, On chronic O2   HEART: Regular rate and rhythm; No murmurs, rubs, or gallops  ABDOMEN: Soft, Nontender, Nondistended; Bowel sounds present  PSYCH: AAOx3  NEUROLOGY: CN II-XII grossly intact, moving all extremities    LABS:  CAPILLARY BLOOD GLUCOSE      POCT Blood Glucose.: 161 mg/dL (26 Oct 2018 08:36)  POCT Blood Glucose.: 191 mg/dL (25 Oct 2018 22:03)  POCT Blood Glucose.: 135 mg/dL (25 Oct 2018 17:23)                      RADIOLOGY & ADDITIONAL TESTS:    Imaging Personally Reviewed:    Consultant(s) Notes Reviewed:      Care Discussed with Consultants/Other Providers: Patient care discussed with oncology and palliative care during interdisciplinary rounds, case management, nursing management  and social work were also present. Also spoke with Dr. Reynoso's yesterday (primary pulmonologist). Will follow up as an outpatient.

## 2018-10-26 NOTE — PROGRESS NOTE ADULT - PROBLEM SELECTOR PROBLEM 3
Debility
Pulmonary neoplasm

## 2018-10-26 NOTE — PROGRESS NOTE ADULT - PROBLEM SELECTOR PROBLEM 4
COPD (chronic obstructive pulmonary disease)
Palliative care encounter
COPD (chronic obstructive pulmonary disease)

## 2018-10-26 NOTE — PROGRESS NOTE ADULT - PROBLEM SELECTOR PLAN 5
Patient on 3 anti-hypertensive meds.  SBP in 160s  -continue home acei, clonidine, diltiazem, along with holding parameters  -f/u with repeat measurements

## 2018-10-27 PROBLEM — D49.1 NEOPLASM OF UNSPECIFIED BEHAVIOR OF RESPIRATORY SYSTEM: Chronic | Status: ACTIVE | Noted: 2018-10-22

## 2018-10-27 PROBLEM — I25.10 ATHEROSCLEROTIC HEART DISEASE OF NATIVE CORONARY ARTERY WITHOUT ANGINA PECTORIS: Chronic | Status: ACTIVE | Noted: 2018-10-22

## 2018-10-27 PROBLEM — E11.9 TYPE 2 DIABETES MELLITUS WITHOUT COMPLICATIONS: Chronic | Status: ACTIVE | Noted: 2018-10-22

## 2018-10-29 ENCOUNTER — OTHER (OUTPATIENT)
Age: 83
End: 2018-10-29

## 2018-10-29 ENCOUNTER — APPOINTMENT (OUTPATIENT)
Dept: RADIATION ONCOLOGY | Facility: CLINIC | Age: 83
End: 2018-10-29
Payer: MEDICARE

## 2018-10-29 ENCOUNTER — OUTPATIENT (OUTPATIENT)
Dept: OUTPATIENT SERVICES | Facility: HOSPITAL | Age: 83
LOS: 1 days | Discharge: HOME | End: 2018-10-29
Payer: MEDICARE

## 2018-10-29 VITALS
OXYGEN SATURATION: 94 % | DIASTOLIC BLOOD PRESSURE: 73 MMHG | HEART RATE: 56 BPM | RESPIRATION RATE: 18 BRPM | SYSTOLIC BLOOD PRESSURE: 143 MMHG

## 2018-10-29 DIAGNOSIS — C79.31 SECONDARY MALIGNANT NEOPLASM OF BRAIN: ICD-10-CM

## 2018-10-29 DIAGNOSIS — C34.90 MALIGNANT NEOPLASM OF UNSPECIFIED PART OF UNSPECIFIED BRONCHUS OR LUNG: ICD-10-CM

## 2018-10-29 DIAGNOSIS — C80.1 SECONDARY MALIGNANT NEOPLASM OF BRAIN: ICD-10-CM

## 2018-10-29 DIAGNOSIS — C79.31 MALIGNANT NEOPLASM OF UNSPECIFIED PART OF UNSPECIFIED BRONCHUS OR LUNG: ICD-10-CM

## 2018-10-29 DIAGNOSIS — Z90.710 ACQUIRED ABSENCE OF BOTH CERVIX AND UTERUS: Chronic | ICD-10-CM

## 2018-10-29 PROCEDURE — 99215 OFFICE O/P EST HI 40 MIN: CPT | Mod: 25

## 2018-10-29 PROCEDURE — 77263 THER RADIOLOGY TX PLNG CPLX: CPT

## 2018-10-29 RX ORDER — DEXAMETHASONE 4 MG/1
4 TABLET ORAL EVERY 8 HOURS
Refills: 0 | Status: ACTIVE | COMMUNITY
Start: 2018-10-29

## 2018-10-29 RX ORDER — AZITHROMYCIN 250 MG/1
250 TABLET, FILM COATED ORAL
Qty: 6 | Refills: 0 | Status: DISCONTINUED | COMMUNITY
Start: 2018-07-13 | End: 2018-10-29

## 2018-10-29 RX ORDER — PREDNISONE 20 MG/1
20 TABLET ORAL
Qty: 8 | Refills: 0 | Status: DISCONTINUED | COMMUNITY
Start: 2018-01-26 | End: 2018-10-29

## 2018-10-29 RX ORDER — OSELTAMIVIR PHOSPHATE 75 MG/1
75 CAPSULE ORAL
Qty: 8 | Refills: 0 | Status: DISCONTINUED | COMMUNITY
Start: 2018-01-26 | End: 2018-10-29

## 2018-10-29 RX ORDER — AZITHROMYCIN 500 MG/1
500 TABLET, FILM COATED ORAL
Qty: 3 | Refills: 0 | Status: DISCONTINUED | COMMUNITY
Start: 2018-01-26 | End: 2018-10-29

## 2018-10-29 NOTE — HISTORY OF PRESENT ILLNESS
[FreeTextEntry1] : Ms. Ge presents today for consideration for radiation therapy of a newly diagnosed symptomatic brain metastases. \par \par She is an 86 year old female with PMH of COPD, OA, CAD, HLD, hearing loss, DM, and essential tremor. She was in [facility] in June, where she was noted to have pulmonary nodules during treatment for pnuemonia. She declined biopsy, given her ages - agreeing to proceed with a radiographic diagnosis alone.  \par \par PET scan 7/20/2018 showed FDG avid centrally necrotic lingular mass suspicious for malignancy. There were mildly avid bilateral hilar and mediastinal lymph nodes, which were indeterminate. No FDG avid distant metastatic disease. There was a mildly FDG avid left thyroid nodule.  There have been ongoing discussions with the family regarding hospice care.\par \par On 10/21 she presented to [facility] with chief complaint of dizziness X 4 days. A CT head revealed a broad based density in the right cerebellum, consistent with a brain metastases.  There was mass effect on the 4th ventricle. MRI head on 10/24/18 showed Right cerebellar 2.1 x 2.1 x 2.0 cm mass consistent with metastatic disease in this patient with known lung cancer. Mass effect upon the fourth ventricle is seen without rostral hydrocephalus Moderate severity microvascular ischemic change.Right frontal vertex 0.5 x 0.7 x 0.7 cm dural based enhancing mass which may represent an incidental meningioma though a focus of dural based metastatic disease cannot entirely be excluded.Sclerotic focus along the right frontal bone which may represent sclerotic metastasis though a bone island cannot be excluded. Bone scan imaging may be done for further evaluation. Intracranial MR angiography demonstrates no significant stenosis, evidence for aneurysm, or vascular malformation. Extracranial MR angiography is technically limited.\par \par She was seen as an inpatient by Dr. Sheldon Guerra on 10/25 and Dr. Hickey.  She was started on dexamethasone after which her dizzyness largely resolved.  She denies headaches, nausea, blurry vision. seizures, LOC.  She is not currently on any anti-epilleptics.  She denies motor weakness.

## 2018-10-29 NOTE — VITALS
[Maximal Pain Intensity: 0/10] : 0/10 [Date: ____________] : Patient's last distress assessment performed on [unfilled]. [10 - Extreme Distress] : Distress Level: 10 [Least Pain Intensity: 0/10] : 0/10 [60: Requires occasional assistance, but is able to care for most of his/her needs] : 60: Requires occasional assistance, but is able to care for most of his/her needs [ECOG Performance Status: 2 - Ambulatory and capable of all self care but unable to carry out any work activities] : Performance Status: 2 - Ambulatory and capable of all self care but unable to carry out any work activities. Up and about more than 50% of waking hours

## 2018-10-29 NOTE — PHYSICAL EXAM
[General Appearance - Well Developed] : well developed [General Appearance - Well Nourished] : well nourished [] : no respiratory distress [Heart Sounds] : normal S1 and S2 [Nondistended] : nondistended [Normal] : normal external genitalia [Normal] : oriented to person, place and time, the affect was normal, the mood was normal and not anxious [Oriented To Time, Place, And Person] : oriented to person, place, and time [de-identified] : cranial nerves 2-12 grossly intact, no deficits, Finger-nose testing, Humble intact

## 2018-10-29 NOTE — DISEASE MANAGEMENT
[Clinical] : TNM Stage: c [IV] : IV [FreeTextEntry4] : Stage IV lung cancer [TTNM] : x [NTNM] : x [MTNM] : 1

## 2018-10-29 NOTE — REVIEW OF SYSTEMS
[Chest Pain] : chest pain [Shortness Of Breath] : shortness of breath [Cough] : cough [SOB on Exertion] : shortness of breath during exertion [Abdominal Pain] : abdominal pain [Gait Disturbance] : gait disturbance [Confused] : confusion [Dizziness] : dizziness [Negative] : Heme/Lymph [Fever] : no fever [Chills] : no chills [Night Sweats] : no night sweats [Fatigue] : no fatigue [Loss of Hearing] : no loss of hearing [Hearing Disturbances] : no hearing disturbances [Vomiting] : no vomiting [Constipation] : no constipation [Muscle Pain] : no muscle pain [Muscle Weakness] : no muscle weakness [Fainting] : no fainting [Difficulty Walking] : no difficulty walking [FreeTextEntry2] : energy improved with steroids [FreeTextEntry3] : once in a while gets pain behind eyes [FreeTextEntry4] : long standing hearing loss [FreeTextEntry5] : chest pain on exertion [FreeTextEntry8] : urine slightly light [FreeTextEntry9] : trouble with balance, starting 2 weeks ago [de-identified] : dizziness and confusion better with steroids

## 2018-10-31 ENCOUNTER — OUTPATIENT (OUTPATIENT)
Dept: OUTPATIENT SERVICES | Facility: HOSPITAL | Age: 83
LOS: 1 days | Discharge: HOME | End: 2018-10-31
Payer: MEDICARE

## 2018-10-31 DIAGNOSIS — Z90.710 ACQUIRED ABSENCE OF BOTH CERVIX AND UTERUS: Chronic | ICD-10-CM

## 2018-11-04 ENCOUNTER — FORM ENCOUNTER (OUTPATIENT)
Age: 83
End: 2018-11-04

## 2018-11-05 ENCOUNTER — OUTPATIENT (OUTPATIENT)
Dept: OUTPATIENT SERVICES | Facility: HOSPITAL | Age: 83
LOS: 1 days | End: 2018-11-05
Payer: MEDICARE

## 2018-11-05 ENCOUNTER — APPOINTMENT (OUTPATIENT)
Dept: MRI IMAGING | Facility: IMAGING CENTER | Age: 83
End: 2018-11-05

## 2018-11-05 DIAGNOSIS — C34.90 MALIGNANT NEOPLASM OF UNSPECIFIED PART OF UNSPECIFIED BRONCHUS OR LUNG: ICD-10-CM

## 2018-11-05 DIAGNOSIS — Z90.710 ACQUIRED ABSENCE OF BOTH CERVIX AND UTERUS: Chronic | ICD-10-CM

## 2018-11-05 PROCEDURE — 77334 RADIATION TREATMENT AID(S): CPT | Mod: 26

## 2018-11-05 PROCEDURE — 82565 ASSAY OF CREATININE: CPT

## 2018-11-05 PROCEDURE — A9585: CPT

## 2018-11-05 PROCEDURE — 77290 THER RAD SIMULAJ FIELD CPLX: CPT | Mod: 26

## 2018-11-05 PROCEDURE — 76498 UNLISTED MR PROCEDURE: CPT

## 2018-11-06 ENCOUNTER — EMERGENCY (EMERGENCY)
Facility: HOSPITAL | Age: 83
LOS: 1 days | Discharge: HOME | End: 2018-11-06
Attending: EMERGENCY MEDICINE | Admitting: EMERGENCY MEDICINE
Payer: MEDICARE

## 2018-11-06 VITALS
OXYGEN SATURATION: 100 % | SYSTOLIC BLOOD PRESSURE: 160 MMHG | HEART RATE: 102 BPM | DIASTOLIC BLOOD PRESSURE: 100 MMHG | RESPIRATION RATE: 18 BRPM

## 2018-11-06 VITALS
OXYGEN SATURATION: 97 % | HEART RATE: 94 BPM | RESPIRATION RATE: 17 BRPM | SYSTOLIC BLOOD PRESSURE: 141 MMHG | TEMPERATURE: 98 F | DIASTOLIC BLOOD PRESSURE: 99 MMHG

## 2018-11-06 DIAGNOSIS — Z90.710 ACQUIRED ABSENCE OF BOTH CERVIX AND UTERUS: Chronic | ICD-10-CM

## 2018-11-06 LAB
ALBUMIN SERPL ELPH-MCNC: 3.4 G/DL — SIGNIFICANT CHANGE UP (ref 3.3–5)
ALP SERPL-CCNC: 153 U/L — HIGH (ref 40–120)
ALT FLD-CCNC: 50 U/L — HIGH (ref 4–33)
ANISOCYTOSIS BLD QL: SLIGHT — SIGNIFICANT CHANGE UP
AST SERPL-CCNC: 19 U/L — SIGNIFICANT CHANGE UP (ref 4–32)
B-OH-BUTYR SERPL-SCNC: 0.1 MMOL/L — SIGNIFICANT CHANGE UP (ref 0–0.4)
BASE EXCESS BLDV CALC-SCNC: 4.5 MMOL/L — SIGNIFICANT CHANGE UP
BASOPHILS # BLD AUTO: 0.01 K/UL — SIGNIFICANT CHANGE UP (ref 0–0.2)
BASOPHILS NFR BLD AUTO: 0.1 % — SIGNIFICANT CHANGE UP (ref 0–2)
BASOPHILS NFR SPEC: 0 % — SIGNIFICANT CHANGE UP (ref 0–2)
BILIRUB SERPL-MCNC: 0.3 MG/DL — SIGNIFICANT CHANGE UP (ref 0.2–1.2)
BLASTS # FLD: 0 % — SIGNIFICANT CHANGE UP (ref 0–0)
BLOOD GAS VENOUS - CREATININE: 0.64 MG/DL — SIGNIFICANT CHANGE UP (ref 0.5–1.3)
BUN SERPL-MCNC: 34 MG/DL — HIGH (ref 7–23)
CALCIUM SERPL-MCNC: 8.4 MG/DL — SIGNIFICANT CHANGE UP (ref 8.4–10.5)
CHLORIDE BLDV-SCNC: 100 MMOL/L — SIGNIFICANT CHANGE UP (ref 96–108)
CHLORIDE SERPL-SCNC: 96 MMOL/L — LOW (ref 98–107)
CO2 SERPL-SCNC: 26 MMOL/L — SIGNIFICANT CHANGE UP (ref 22–31)
CREAT SERPL-MCNC: 0.75 MG/DL — SIGNIFICANT CHANGE UP (ref 0.5–1.3)
EOSINOPHIL # BLD AUTO: 0 K/UL — SIGNIFICANT CHANGE UP (ref 0–0.5)
EOSINOPHIL NFR BLD AUTO: 0 % — SIGNIFICANT CHANGE UP (ref 0–6)
EOSINOPHIL NFR FLD: 0 % — SIGNIFICANT CHANGE UP (ref 0–6)
GAS PNL BLDV: 133 MMOL/L — LOW (ref 136–146)
GIANT PLATELETS BLD QL SMEAR: PRESENT — SIGNIFICANT CHANGE UP
GLUCOSE BLDV-MCNC: 399 — HIGH (ref 70–99)
GLUCOSE SERPL-MCNC: 452 MG/DL — CRITICAL HIGH (ref 70–99)
HCO3 BLDV-SCNC: 28 MMOL/L — HIGH (ref 20–27)
HCT VFR BLD CALC: 34.5 % — SIGNIFICANT CHANGE UP (ref 34.5–45)
HCT VFR BLDV CALC: 35.3 % — SIGNIFICANT CHANGE UP (ref 34.5–45)
HGB BLD-MCNC: 11.7 G/DL — SIGNIFICANT CHANGE UP (ref 11.5–15.5)
HGB BLDV-MCNC: 11.5 G/DL — SIGNIFICANT CHANGE UP (ref 11.5–15.5)
IMM GRANULOCYTES # BLD AUTO: 0.34 # — SIGNIFICANT CHANGE UP
IMM GRANULOCYTES NFR BLD AUTO: 2 % — HIGH (ref 0–1.5)
LACTATE BLDV-MCNC: 3.4 MMOL/L — HIGH (ref 0.5–2)
LYMPHOCYTES # BLD AUTO: 0.31 K/UL — LOW (ref 1–3.3)
LYMPHOCYTES # BLD AUTO: 1.8 % — LOW (ref 13–44)
LYMPHOCYTES NFR SPEC AUTO: 2.6 % — LOW (ref 13–44)
MCHC RBC-ENTMCNC: 26.1 PG — LOW (ref 27–34)
MCHC RBC-ENTMCNC: 33.9 % — SIGNIFICANT CHANGE UP (ref 32–36)
MCV RBC AUTO: 77 FL — LOW (ref 80–100)
METAMYELOCYTES # FLD: 0 % — SIGNIFICANT CHANGE UP (ref 0–1)
MICROCYTES BLD QL: SLIGHT — SIGNIFICANT CHANGE UP
MONOCYTES # BLD AUTO: 0.36 K/UL — SIGNIFICANT CHANGE UP (ref 0–0.9)
MONOCYTES NFR BLD AUTO: 2.1 % — SIGNIFICANT CHANGE UP (ref 2–14)
MONOCYTES NFR BLD: 5.2 % — SIGNIFICANT CHANGE UP (ref 2–9)
MYELOCYTES NFR BLD: 0.9 % — HIGH (ref 0–0)
NEUTROPHIL AB SER-ACNC: 91.3 % — HIGH (ref 43–77)
NEUTROPHILS # BLD AUTO: 15.82 K/UL — HIGH (ref 1.8–7.4)
NEUTROPHILS NFR BLD AUTO: 94 % — HIGH (ref 43–77)
NEUTS BAND # BLD: 0 % — SIGNIFICANT CHANGE UP (ref 0–6)
NRBC # FLD: 0 — SIGNIFICANT CHANGE UP
OTHER - HEMATOLOGY %: 0 — SIGNIFICANT CHANGE UP
OVALOCYTES BLD QL SMEAR: SLIGHT — SIGNIFICANT CHANGE UP
PCO2 BLDV: 48 MMHG — SIGNIFICANT CHANGE UP (ref 41–51)
PH BLDV: 7.4 PH — SIGNIFICANT CHANGE UP (ref 7.32–7.43)
PLATELET # BLD AUTO: 120 K/UL — LOW (ref 150–400)
PLATELET COUNT - ESTIMATE: SIGNIFICANT CHANGE UP
PMV BLD: 11.5 FL — SIGNIFICANT CHANGE UP (ref 7–13)
PO2 BLDV: 54 MMHG — HIGH (ref 35–40)
POTASSIUM BLDV-SCNC: 3.9 MMOL/L — SIGNIFICANT CHANGE UP (ref 3.4–4.5)
POTASSIUM SERPL-MCNC: 4.6 MMOL/L — SIGNIFICANT CHANGE UP (ref 3.5–5.3)
POTASSIUM SERPL-SCNC: 4.6 MMOL/L — SIGNIFICANT CHANGE UP (ref 3.5–5.3)
PROMYELOCYTES # FLD: 0 % — SIGNIFICANT CHANGE UP (ref 0–0)
PROT SERPL-MCNC: 6.1 G/DL — SIGNIFICANT CHANGE UP (ref 6–8.3)
RBC # BLD: 4.48 M/UL — SIGNIFICANT CHANGE UP (ref 3.8–5.2)
RBC # FLD: 15.9 % — HIGH (ref 10.3–14.5)
SAO2 % BLDV: 84.4 % — SIGNIFICANT CHANGE UP (ref 60–85)
SODIUM SERPL-SCNC: 135 MMOL/L — SIGNIFICANT CHANGE UP (ref 135–145)
VARIANT LYMPHS # BLD: 0 % — SIGNIFICANT CHANGE UP
WBC # BLD: 16.84 K/UL — HIGH (ref 3.8–10.5)
WBC # FLD AUTO: 16.84 K/UL — HIGH (ref 3.8–10.5)

## 2018-11-06 PROCEDURE — 99284 EMERGENCY DEPT VISIT MOD MDM: CPT | Mod: GC

## 2018-11-06 RX ORDER — INSULIN LISPRO 100/ML
5 VIAL (ML) SUBCUTANEOUS ONCE
Qty: 0 | Refills: 0 | Status: COMPLETED | OUTPATIENT
Start: 2018-11-06 | End: 2018-11-06

## 2018-11-06 RX ORDER — SODIUM CHLORIDE 9 MG/ML
1000 INJECTION INTRAMUSCULAR; INTRAVENOUS; SUBCUTANEOUS ONCE
Qty: 0 | Refills: 0 | Status: COMPLETED | OUTPATIENT
Start: 2018-11-06 | End: 2018-11-06

## 2018-11-06 RX ADMIN — SODIUM CHLORIDE 1000 MILLILITER(S): 9 INJECTION INTRAMUSCULAR; INTRAVENOUS; SUBCUTANEOUS at 21:42

## 2018-11-06 RX ADMIN — Medication 5 UNIT(S): at 22:50

## 2018-11-06 NOTE — ED ADULT NURSE NOTE - OBJECTIVE STATEMENT
Eulalia RN: Patient received in room #16 c/o weakness and elevated glucose levels. Patient A&Ox3, ambulatory with walker. Patient states she is taking decadron to "shrink the tumor". Patient reports hx. lung tumor with mets to cerebellum. Hx. COPD on 4L NC. VS as noted. 22G IV PLaced in left hand, labs drawn and sent. will monitor. Report given to primary RN Radha.

## 2018-11-06 NOTE — ED ADULT TRIAGE NOTE - CHIEF COMPLAINT QUOTE
pt recently placed on steroids to shrink tumor so she can have first radiation treatment tomorrow. as per daughter has been increasingly weak and glucose was 525.  f/s 477 in triage

## 2018-11-06 NOTE — ED PROVIDER NOTE - PROGRESS NOTE DETAILS
ludwin: glucose 380. pt states she feels better and wants to be discharged. We watched her walk and her family states this is baseline. Will give additonal 500 cc saline; start metformin and pt to f/u pcp

## 2018-11-06 NOTE — ED PROVIDER NOTE - NS ED ROS FT
REVIEW OF SYSTEMS  General: + Generalized weakness, No fever or chills  Skin/Breast: No itching or rash  Ophthalmologic: No eye pain or vision changes  ENMT: No sore throat or nasal congestion  Respiratory and Thorax: No shortness of breath or cough  Cardiovascular: No chest pain or palpitations  Gastrointestinal: No vomiting or abdominal pain  Genitourinary: + Polyuria, No dysuria  Neurological: No headache or dizziness  Endocrine: + Hyperglycemia, + Polydipsia

## 2018-11-06 NOTE — ED PROVIDER NOTE - MEDICAL DECISION MAKING DETAILS
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 Will check CBC, CMP, VBG w/ lytes, and Beta-hydroxybutyrate. Will give IVF/Insulin and reassess.

## 2018-11-06 NOTE — ED PROVIDER NOTE - OBJECTIVE STATEMENT
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 Patient was recently admitted to [facility] after she was found to have a cerebellar lesion. She was started on Decadron during her hospitalization and discharged home with plan to follow-up with Radiation Oncology as an outpatient. While at home, patient reports that her FS had initially been remaining in the 300s. However, over the past few days, her FS were 400s and it was high as 590s earlier today. After discharge, patient reports that she has developed increased generalized weakness and fatigue. + Polyuria/polydipsia. She denies any chest pain, shortness of breath, or abdominal pain. No complaints of dizziness. No saddle anesthesia or bowel/urinary incontinence. No falls since discharge. Given patient's generalized weakness and elevated FS, she decided to come to the ED for further evaluation. Of note, patient reports that she is scheduled to follow-up wit Radiation Oncology tomorrow.

## 2018-11-06 NOTE — ED PROVIDER NOTE - FAMILY HISTORY
No pertinent family history in first degree relatives, ~ no history of CAD, smoking, lung cancer reported

## 2018-11-06 NOTE — ED PROVIDER NOTE - ATTENDING CONTRIBUTION TO CARE
ludwin: recently started on decadron for cerebellar mets. has been home from hospital for 4 days, with progressively increasing diffuse body weakness. states  at home; not on any gluocse lowering agents. polydispia, polyuria. scheduled for first RT rx tomorrow to brain  exam: strength 5-/5 all extremities. 'too weak to stand'   impression: hyperglycemia due to decadron  recc: insulin, fluids, reassess. if pt too weak to ambulate will admit. ,

## 2018-11-06 NOTE — ED PROVIDER NOTE - PHYSICAL EXAMINATION
PHYSICAL EXAM:  Constitutional: NAD  HEENT: NC/AT, Slightly dry mucous membranes  Neck: Supple  Back: No spinal TTP  Respiratory: Grossly CTAB; No wheeze appreciated  Cardiovascular: Mild tachycardia, Regular rhythm  Gastrointestinal: + BS, Soft, NT, No rigidity  Genitourinary: No P[place], No suprapubic TTP  Extremities: No LE edema  Neurological: A+Ox3, Answers questions and follows commands appropriately  Skin: Warm and dry  Psychiatric: Normal mood and affect

## 2018-11-06 NOTE — ED ADULT NURSE NOTE - NSIMPLEMENTINTERV_GEN_ALL_ED
Implemented All Fall with Harm Risk Interventions:  [place] to call system. Call bell, personal items and telephone within reach. Instruct patient to call for assistance. Room bathroom lighting operational. Non-slip footwear when patient is off stretcher. Physically safe environment: no spills, clutter or unnecessary equipment. Stretcher in lowest position, wheels locked, appropriate side rails in place. Provide visual cue, wrist band, yellow gown, etc. Monitor gait and stability. Monitor for mental status changes and reorient to person, place, and time. Review medications for side effects contributing to fall risk. Reinforce activity limits and safety measures with patient and family. Provide visual clues: red socks.

## 2018-11-07 ENCOUNTER — APPOINTMENT (OUTPATIENT)
Dept: NEUROSURGERY | Facility: AMBULATORY SURGERY CENTER | Age: 83
End: 2018-11-07
Payer: MEDICARE

## 2018-11-07 PROCEDURE — 61798 SRS CRANIAL LESION COMPLEX: CPT

## 2018-11-07 RX ORDER — METFORMIN HYDROCHLORIDE 850 MG/1
1 TABLET ORAL
Qty: 60 | Refills: 0 | OUTPATIENT
Start: 2018-11-07 | End: 2018-12-06

## 2018-11-07 RX ORDER — METFORMIN HYDROCHLORIDE 850 MG/1
500 TABLET ORAL ONCE
Qty: 0 | Refills: 0 | Status: COMPLETED | OUTPATIENT
Start: 2018-11-07 | End: 2018-11-07

## 2018-11-07 RX ORDER — SODIUM CHLORIDE 9 MG/ML
500 INJECTION INTRAMUSCULAR; INTRAVENOUS; SUBCUTANEOUS ONCE
Qty: 0 | Refills: 0 | Status: COMPLETED | OUTPATIENT
Start: 2018-11-07 | End: 2018-11-07

## 2018-11-07 RX ADMIN — METFORMIN HYDROCHLORIDE 500 MILLIGRAM(S): 850 TABLET ORAL at 01:43

## 2018-11-07 RX ADMIN — Medication 0.1 MILLIGRAM(S): at 00:00

## 2018-11-07 RX ADMIN — SODIUM CHLORIDE 1000 MILLILITER(S): 9 INJECTION INTRAMUSCULAR; INTRAVENOUS; SUBCUTANEOUS at 01:55

## 2018-11-08 PROCEDURE — 77300 RADIATION THERAPY DOSE PLAN: CPT | Mod: 26

## 2018-11-08 PROCEDURE — 77334 RADIATION TREATMENT AID(S): CPT | Mod: 26

## 2018-11-08 PROCEDURE — 77432 STEREOTACTIC RADIATION TRMT: CPT

## 2018-11-08 PROCEDURE — 77295 3-D RADIOTHERAPY PLAN: CPT | Mod: 26

## 2018-11-20 ENCOUNTER — CHART COPY (OUTPATIENT)
Age: 83
End: 2018-11-20

## 2018-11-30 ENCOUNTER — APPOINTMENT (OUTPATIENT)
Dept: PULMONOLOGY | Facility: CLINIC | Age: 83
End: 2018-11-30

## 2018-12-07 ENCOUNTER — APPOINTMENT (OUTPATIENT)
Dept: GERIATRICS | Facility: CLINIC | Age: 83
End: 2018-12-07

## 2018-12-20 ENCOUNTER — APPOINTMENT (OUTPATIENT)
Dept: RADIATION ONCOLOGY | Facility: CLINIC | Age: 83
End: 2018-12-20

## 2019-07-14 NOTE — PATIENT PROFILE ADULT. - NS SC CAGE ALCOHOL EYE OPENER
Subjective:     Interval History: No acute events overnight. Has been refusing most hypertonic saline nebs and PT. Continues to have loose, non-watery BMs. Non-compliant with IS use. She otherwise feels well today.       Continuous Infusions:    Scheduled Meds:   ceftolozane-tazobactam  1,500 mg Intravenous Q8H    cetirizine  10 mg Oral Daily    dapsone  100 mg Oral Daily    enoxaparin  40 mg Subcutaneous Daily    gabapentin  300 mg Oral TID    insulin aspart U-100  2-5 Units Subcutaneous TIDWM    levalbuterol  1.25 mg Nebulization Q6H WAKE    lipase-protease-amylase 24,000-76,000-120,000 units  6 capsule Oral TID WM    metoprolol tartrate  25 mg Oral TID    micafungin (MYCAMINE) IVPB  100 mg Intravenous Q24H    morphine  15 mg Oral Q12H    mycophenolate  500 mg Oral BID    predniSONE  20 mg Oral Daily    QUEtiapine  50 mg Oral QHS    sodium chloride 7%  4 mL Nebulization Q12H    tacrolimus  0.5 mg Oral BID    valGANciclovir  450 mg Oral BID    venlafaxine  150 mg Oral QHS    venlafaxine  37.5 mg Oral QHS    voriconazole (VFEND) IVPB (wt < 83 kg)  4 mg/kg Intravenous Q12H     PRN Meds:acetaminophen, bisacodyl, Dextrose 10% Bolus, Dextrose 10% Bolus, diphenhydrAMINE, glucagon (human recombinant), glucose, glucose, insulin aspart U-100, insulin aspart U-100, lactulose, levalbuterol, lipase-protease-amylase 24,000-76,000-120,000 units, loperamide, LORazepam, magnesium sulfate IVPB, metoclopramide HCl, metoprolol, naloxone, ondansetron, ondansetron, oxyCODONE, potassium chloride in water **AND** potassium chloride in water **AND** potassium chloride in water    Review of patient's allergies indicates:   Allergen Reactions    Albuterol Palpitations    Colistin Anaphylaxis    Vancomycin analogues      Infusion reaction that does not resolve with slowing  Pt. States she can tolerate it when given with 50 mg Benadryl and ran over 3 hours    Neupogen [filgrastim] Other (See Comments)     Ostealgia  after five daily doses of 300 mcg.      Bactrim [sulfamethoxazole-trimethoprim] Hives    Ceftazidime Hives     Pt stated can tolerate cefapine not ceftazidime    Ceftazidime     Dronabinol Other (See Comments)     Mental changes/hallucinations    Haldol [haloperidol lactate] Other (See Comments)     Seizure like activity    Nsaids (non-steroidal anti-inflammatory drug)      Cannot have due to lung transplant    Adhesive Rash     Cloth tape- please use tegaderm or paper tape    Aztreonam Rash    Ciprofloxacin Nausea And Vomiting     Projectile N/V, per patient.  Unwilling to retry therapy.       Review of Systems   Constitutional: Positive for appetite change and fatigue. Negative for chills, diaphoresis and fever.   HENT: Negative for congestion, postnasal drip, rhinorrhea, sinus pressure and sinus pain.    Respiratory: Positive for cough and shortness of breath. Negative for wheezing.    Cardiovascular: Positive for chest pain (incision site). Negative for palpitations and leg swelling.   Gastrointestinal: Negative for abdominal distention, abdominal pain, constipation, diarrhea, nausea and vomiting.   Genitourinary: Negative for decreased urine volume, difficulty urinating, dysuria and urgency.   Musculoskeletal: Positive for back pain (chronic). Negative for neck pain and neck stiffness.   Skin: Positive for pallor. Negative for rash and wound.   Allergic/Immunologic: Positive for immunocompromised state.   Neurological: Positive for weakness. Negative for dizziness, seizures, syncope and headaches.   Psychiatric/Behavioral: Negative for agitation, confusion and decreased concentration. The patient is nervous/anxious.      Objective:   Physical Exam   Constitutional: She is oriented to person, place, and time. She appears well-developed and well-nourished. She is easily aroused. No distress.   HENT:   Head: Normocephalic and atraumatic.   Nose: Nose normal.   Eyes: Conjunctivae and EOM are normal. No  scleral icterus.   Neck: Normal range of motion. Neck supple. No JVD present.   Cardiovascular: Normal rate, regular rhythm and normal heart sounds. Exam reveals no friction rub.   No murmur heard.  Pulmonary/Chest: Effort normal. No accessory muscle usage. No respiratory distress. She has decreased breath sounds in the right lower field and the left lower field. She has no wheezes. She has no rhonchi. She has no rales.   Abdominal: Soft. Bowel sounds are normal. She exhibits no distension. There is no tenderness.   Musculoskeletal: Normal range of motion. She exhibits no edema.   Neurological: She is alert, oriented to person, place, and time and easily aroused.   Skin: Skin is warm and dry. She is not diaphoretic. No erythema.   Psychiatric: Judgment normal. Her mood appears not anxious. Her speech is not delayed. She is not slowed. She exhibits normal recent memory.   Nursing note and vitals reviewed.        Vital Signs (Most Recent):  Temp: 98.3 °F (36.8 °C) (07/14/19 0911)  Pulse: 87 (07/14/19 1010)  Resp: 16 (07/14/19 1010)  BP: 130/80 (07/14/19 0911)  SpO2: (!) 94 % (07/14/19 1010) Vital Signs (24h Range):  Temp:  [98 °F (36.7 °C)-99.3 °F (37.4 °C)] 98.3 °F (36.8 °C)  Pulse:  [79-89] 87  Resp:  [16-20] 16  SpO2:  [92 %-95 %] 94 %  BP: (106-130)/(68-82) 130/80     Weight: 54.4 kg (119 lb 14.9 oz)  Body mass index is 22.66 kg/m².      Intake/Output Summary (Last 24 hours) at 7/14/2019 1112  Last data filed at 7/14/2019 0905  Gross per 24 hour   Intake 1110 ml   Output 0 ml   Net 1110 ml       Ventilator Data:          Hemodynamic Parameters:       Lines/Drains:       Introducer right internal jugular (Active)   Specific Qualities Capped 6/30/2019  7:10 AM   Dressing Status Clean;Dry;Biopatch in place;Intact 6/30/2019  7:10 AM   Dressing Intervention Dressing reinforced 6/29/2019  3:00 AM   Dressing Change Due 06/29/19 6/30/2019  3:00 AM   Daily Line Review Performed 6/30/2019  7:10 AM   Number of days:              Port A Cath Single Lumen 06/24/14 1200 right subclavian (Active)   Accessed by: Radha Lanza 6/24/2019  5:00 AM   Dressing Type Transparent 6/30/2019  7:10 AM   Dressing Status Biopatch in place;Clean;Dry;Intact 6/30/2019  7:10 AM   Dressing Intervention Dressing reinforced 6/29/2019  3:00 AM   Dressing Change Due 06/30/19 6/30/2019  3:00 AM   Line Status Infusing 6/30/2019  7:10 AM   Flush Performed Yes 6/30/2019  7:10 AM   Date to be Reflushed 06/18/19 6/29/2019  3:00 AM   Daily Line Review Performed 6/30/2019  7:10 AM   Type of Needle Nicole 6/30/2019  3:00 AM   Gauge 20 6/30/2019  3:00 AM   Needle Length 1 in 6/30/2019  3:00 AM   Needle Status Left in place 6/30/2019  3:00 AM   Needle Insertion Date 06/24/19 6/30/2019  3:00 AM   Needle Insertion Time 0500 6/26/2019  3:00 AM   Number of days: 1831            Percutaneous Central Line Insertion/Assessment - double lumen  06/18/19 0510 (Active)   Dressing biopatch in place;dressing dry and intact 6/30/2019  7:10 AM   Securement secured w/ sutures 6/30/2019  7:10 AM   Additional Site Signs no erythema;no warmth 6/30/2019  7:10 AM   Distal Patency/Care infusing 6/30/2019  7:10 AM   Proximal Patency/Care infusing 6/30/2019  7:10 AM   Waveform normal 6/30/2019  7:10 AM   Line Interventions line leveled/zeroed 6/30/2019  7:10 AM   Dressing Change Due 06/29/19 6/30/2019  3:00 AM   Daily Line Review Performed 6/30/2019  7:10 AM   Number of days: 12            Peripheral IV - Single Lumen 06/27/19 0917 22 G Right Forearm (Active)   Site Assessment Clean;Dry;Intact;No redness;No swelling 6/30/2019  7:10 AM   Line Status Infusing 6/30/2019  7:10 AM   Dressing Status Clean;Dry;Intact 6/30/2019  7:10 AM   Dressing Intervention Dressing reinforced 6/29/2019  3:00 AM   Dressing Change Due 07/01/19 6/30/2019  3:00 AM   Site Change Due 07/01/19 6/29/2019  7:00 PM   Reason Not Rotated Not due 6/30/2019  7:10 AM   Number of days: 3            Chest Tube 06/29/19 1655 1 Right  Fourth intercostal space;Midaxillary 7 Fr. (Active)   Chest Tube WDL WDL 6/30/2019  7:10 AM   Function -20 cm H2O 6/30/2019  7:10 AM   Air Leak/Fluctuation air leak present 6/30/2019  7:10 AM   Safety all tubing connections taped;2 rubber-tipped hemostats w/ patient;all connections secured;suction checked 6/30/2019  7:10 AM   Securement tubing secured to body distal to insertion site w/ tape 6/30/2019  7:10 AM   Patency Intervention Tip/tilt 6/30/2019  7:10 AM   Drainage Description Serosanguineous 6/30/2019  7:10 AM   Dressing Appearance occlusive gauze dressing intact 6/30/2019  7:10 AM   Dressing Care dressing changed 6/30/2019 10:00 AM   Left Subcutaneous Emphysema none present 6/30/2019  7:10 AM   Right Subcutaneous Emphysema none present 6/30/2019  7:10 AM   Site Assessment Clean;Dry;Intact;No redness;No swelling;Ecchymotic 6/30/2019 10:00 AM   Surrounding Skin Dry;Intact 6/30/2019  7:10 AM   Output (mL) 0 mL 6/30/2019 10:00 AM   Number of days: 0            Y Chest Tube 3 and 4 06/18/19 1151 3 Left Pleural 19 Fr. 4 Left Mediastinal 19 Fr. (Active)   Function -20 cm H2O 6/30/2019  7:10 AM   Air Leak/Fluctuation air leak not present 6/30/2019  7:10 AM   Safety all tubing connections taped;2 rubber-tipped hemostats w/ patient;all connections secured;suction checked 6/30/2019  7:10 AM   Securement tubing secured to body distal to insertion site w/ tape 6/30/2019  7:10 AM   Left Subcutaneous Emphysema none present 6/30/2019  7:10 AM   Right Subcutaneous Emphysema none present 6/30/2019  7:10 AM   Patency Intervention Tip/tilt 6/30/2019  7:10 AM   Drainage Description 3 Serosanguineous 6/30/2019  7:10 AM   Tube 3 Dressing Appearance occlusive gauze dressing intact 6/30/2019  7:10 AM   Tube 3 Dressing Care dressing changed 6/30/2019 10:00 AM   Site Assessment 3 Clean;Dry;Intact 6/30/2019 10:00 AM   Surrounding Skin 3 Dry;Intact 6/30/2019  7:10 AM   Drainage Description 4 Serosanguineous 6/30/2019  7:10 AM   Tube 4  Dressing Appearance occlusive gauze dressing intact 6/30/2019  7:10 AM   Tube 4 Dressing Care dressing changed 6/30/2019 10:00 AM   Site Assessment 4 Clean;Dry;Intact 6/30/2019 10:00 AM   Surrounding Skin 4 Dry;Intact 6/30/2019  7:10 AM   Output (mL) 0 mL 6/30/2019 10:00 AM   Number of days: 11       Significant Labs:  CBC:  Recent Labs   Lab 07/14/19  0600   WBC 3.82*   RBC 3.16*   HGB 8.9*   HCT 29.8*   *   MCV 94   MCH 28.2   MCHC 29.9*     BMP:  Recent Labs   Lab 07/14/19  0600      K 3.5      CO2 22*   BUN 16   CREATININE 0.9   CALCIUM 8.3*      Tacrolimus Levels:  Recent Labs   Lab 07/14/19  0600   TACROLIMUS 7.7     Microbiology:  Microbiology Results (last 7 days)     Procedure Component Value Units Date/Time    Fungus culture [[id number]]  (Abnormal) Collected:  07/05/19 1445    Order Status:  Completed Specimen:  Respiratory from Bronchial Wash Updated:  07/11/19 0852     Fungus (Mycology) Culture BRANDI GLABRATA  Many      Narrative:       Bronchial Wash    Fungus culture [[id number]] Collected:  06/25/19 0900    Order Status:  Completed Specimen:  Respiratory from Bronchial Wash Updated:  07/10/19 0918     Fungus (Mycology) Culture Culture in progress      No fungus isolated after 2 weeks    Narrative:       Bronchial Wash    AFB Culture & Smear [[id number]] Collected:  07/05/19 1445    Order Status:  Completed Specimen:  Respiratory from Bronchial Wash Updated:  07/08/19 1540     AFB Culture & Smear Culture in progress     AFB CULTURE STAIN No acid fast bacilli seen.    Narrative:       Bronchial Wash    Culture, Respiratory [[id number]]  (Abnormal)  (Susceptibility) Collected:  07/05/19 1445    Order Status:  Completed Specimen:  Respiratory from Bronchial Wash Updated:  07/08/19 1100     Respiratory Culture No S aureus isolated.      PSEUDOMONAS AERUGINOSA   Moderate  Normal respiratory anibal also present       Gram Stain (Respiratory) Few WBC's     Gram Stain (Respiratory) Few Gram  positive rods     Gram Stain (Respiratory) Few budding yeast    Narrative:       Bronchial Wash          I have reviewed all pertinent labs within the past 24 hours.    Diagnostic Results:  Chest X-Ray: Vascular catheters, right pleural drain and monitoring leads remain.  There is some high-density material in the upper abdomen likely from the modified barium swallow.  Heart is enlarged there is mild increase in the pulmonary vascular markings.  Persistent parenchymal opacification bilaterally.  No significant pneumothorax seen.  Probable right pleural effusion.          no

## 2019-12-25 PROBLEM — C34.90 PRIMARY MALIGNANT NEOPLASM OF LUNG WITH METASTASIS TO BRAIN: Status: ACTIVE | Noted: 2018-10-29

## 2020-01-20 NOTE — ED ADULT NURSE NOTE - NSFALLRSKASSESSDT_ED_ALL_ED
January 20, 2020      Elissa Navarro,   [address] [facility] [place] [address]           [facility]  [address], [address]  [facility] [address]  Phone: [phone]          Patient: Darrell Hinds Jr.   MR Number: [id number]   YOB: 1958   Date of Visit: 1/20/2020       Dear Dr. Elissa Navarro:    Thank you for referring Darrell Hinds to me for evaluation. Attached you will find relevant portions of my assessment and plan of care.    If you have questions, please do not hesitate to call me. I look forward to following Darrell Hinds along with you.    Sincerely,    Jimmy Castillo Jr., MD    Enclosure  CC:  No Recipients    If you would like to receive this communication electronically, please contact [email or web address] or [phone] to request more information on [employer] access.    For providers and/or their staff who would like to refer a patient to [facility], please contact us through our one-stop-shop provider referral line, [facility], at [phone].    If you feel you have received this communication in error or would no longer like to receive these types of communications, please e-mail [email or web address]          06-Nov-2018 21:33

## 2021-03-11 PROBLEM — E66.2 OBESITY HYPOVENTILATION SYNDROME: Status: ACTIVE | Noted: 2018-06-22

## 2021-05-27 NOTE — OCCUPATIONAL THERAPY INITIAL EVALUATION ADULT - MD ORDER
Occupational Therapy to evaluate and treat. Trilobed Flap Text: The defect edges were debeveled with a #15 scalpel blade.  Given the location of the defect and the proximity to free margins a trilobed flap was deemed most appropriate.  Using a sterile surgical marker, an appropriate trilobed flap drawn around the defect.    The area thus outlined was incised deep to adipose tissue with a #15 scalpel blade.  The skin margins were undermined to an appropriate distance in all directions utilizing iris scissors.

## 2021-09-13 NOTE — DISCHARGE NOTE ADULT - PATIENT PORTAL LINK FT
“You can access the [facility] Patient Portal, offered by [facility], by registering with the following website: [email or web address]” Clear

## 2023-12-11 NOTE — H&P ADULT - PROBLEM/PLAN-2
I sent in valium for MRI.  Also I sent in rest of Hydrocodone rx as they gave only #55 when filled ([facility] verified).   DISPLAY PLAN FREE TEXT

## 2024-07-23 NOTE — PATIENT PROFILE ADULT. - NS PRO OT REFERRAL QUES 2 YN
Problem: Adult Inpatient Plan of Care  Goal: Plan of Care Review  Outcome: Progressing  Goal: Patient-Specific Goal (Individualized)  Outcome: Progressing  Goal: Absence of Hospital-Acquired Illness or Injury  Outcome: Progressing  Goal: Optimal Comfort and Wellbeing  Outcome: Progressing  Goal: Readiness for Transition of Care  Outcome: Progressing     Problem: Acute Kidney Injury/Impairment  Goal: Fluid and Electrolyte Balance  Outcome: Progressing  Goal: Improved Oral Intake  Outcome: Progressing  Goal: Effective Renal Function  Outcome: Progressing     Problem: Fall Injury Risk  Goal: Absence of Fall and Fall-Related Injury  Outcome: Progressing     Problem: Skin Injury Risk Increased  Goal: Skin Health and Integrity  Outcome: Progressing     Problem: Wound  Goal: Optimal Coping  Outcome: Progressing  Goal: Optimal Functional Ability  Outcome: Progressing  Goal: Absence of Infection Signs and Symptoms  Outcome: Progressing  Goal: Improved Oral Intake  Outcome: Progressing  Goal: Optimal Pain Control and Function  Outcome: Progressing  Goal: Skin Health and Integrity  Outcome: Progressing  Goal: Optimal Wound Healing  Outcome: Progressing     Problem: Pneumonia  Goal: Fluid Balance  Outcome: Progressing  Goal: Resolution of Infection Signs and Symptoms  Outcome: Progressing  Goal: Effective Oxygenation and Ventilation  Outcome: Progressing      no

## 2024-10-15 NOTE — H&P ADULT - PROBLEM SELECTOR PLAN 3
Patient on 4L NC at home with no signs of exacerbation  -continue on 4L NC  -duonebs as needed  -continue home inhaler->budesonide salmeterol. 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 Per patient, being evaluated for hospice and on discussion would like to be DNR/DNI but would also like temporizing treatment if possible.  -plan as above  -will inform Dr. Reynoso about admission  -DNR/DNI form filled out  -can consider palliative care consult if not already in the works with hospice care

## 2025-07-03 NOTE — ED PROVIDER NOTE - GASTROINTESTINAL, MLM
Please make an appointment to follow up with your cardiologist ASAP as discussed.    
Abdomen soft, non-tender, no guarding.